# Patient Record
Sex: MALE | Race: WHITE | NOT HISPANIC OR LATINO | Employment: FULL TIME | URBAN - METROPOLITAN AREA
[De-identification: names, ages, dates, MRNs, and addresses within clinical notes are randomized per-mention and may not be internally consistent; named-entity substitution may affect disease eponyms.]

---

## 2023-04-27 PROBLEM — G47.00 INSOMNIA: Status: ACTIVE | Noted: 2023-04-27

## 2023-05-16 ENCOUNTER — TELEPHONE (OUTPATIENT)
Dept: PRIMARY CARE | Facility: CLINIC | Age: 63
End: 2023-05-16
Payer: COMMERCIAL

## 2023-05-16 PROBLEM — L30.9 ECZEMA: Status: ACTIVE | Noted: 2023-05-16

## 2023-05-16 PROBLEM — M19.90 OSTEOARTHRITIS: Status: ACTIVE | Noted: 2023-05-16

## 2023-05-16 PROBLEM — K57.80 PERFORATED DIVERTICULUM: Status: ACTIVE | Noted: 2023-05-16

## 2023-05-16 PROBLEM — M35.3 POLYMYALGIA RHEUMATICA (MULTI): Status: ACTIVE | Noted: 2023-05-16

## 2023-05-16 PROBLEM — R10.9 FLANK PAIN: Status: ACTIVE | Noted: 2023-05-16

## 2023-05-16 PROBLEM — E78.5 HYPERLIPIDEMIA: Status: ACTIVE | Noted: 2023-05-16

## 2023-05-16 PROBLEM — M48.062 LUMBAR STENOSIS WITH NEUROGENIC CLAUDICATION: Status: ACTIVE | Noted: 2023-05-16

## 2023-05-16 PROBLEM — I26.99 PULMONARY EMBOLISM (MULTI): Status: ACTIVE | Noted: 2023-05-16

## 2023-05-16 PROBLEM — M48.00 SPINAL STENOSIS: Status: ACTIVE | Noted: 2023-05-16

## 2023-05-16 PROBLEM — M54.12 CERVICAL RADICULAR PAIN: Status: ACTIVE | Noted: 2023-05-16

## 2023-05-16 PROBLEM — M54.50 LUMBAR PAIN WITH RADIATION DOWN LEFT LEG: Status: ACTIVE | Noted: 2023-05-16

## 2023-05-16 PROBLEM — M72.2 PLANTAR FASCIITIS, LEFT: Status: ACTIVE | Noted: 2023-05-16

## 2023-05-16 PROBLEM — G47.33 OSA (OBSTRUCTIVE SLEEP APNEA): Status: ACTIVE | Noted: 2023-05-16

## 2023-05-16 PROBLEM — E55.9 VITAMIN D DEFICIENCY: Status: ACTIVE | Noted: 2023-05-16

## 2023-05-16 PROBLEM — J34.2 DEVIATED NASAL SEPTUM: Status: ACTIVE | Noted: 2023-05-16

## 2023-05-16 PROBLEM — M77.02 MEDIAL EPICONDYLITIS OF LEFT ELBOW: Status: ACTIVE | Noted: 2023-05-16

## 2023-05-16 PROBLEM — R06.00 DYSPNEA, UNSPECIFIED: Status: ACTIVE | Noted: 2023-05-16

## 2023-05-16 PROBLEM — M95.0 NASAL DEFORMITY, ACQUIRED: Status: ACTIVE | Noted: 2023-05-16

## 2023-05-16 PROBLEM — M54.16 LUMBAR RADICULOPATHY: Status: ACTIVE | Noted: 2023-05-16

## 2023-05-16 PROBLEM — M17.0 PRIMARY OSTEOARTHRITIS OF BOTH KNEES: Status: ACTIVE | Noted: 2023-05-16

## 2023-05-16 PROBLEM — M25.50 ARTHRALGIA: Status: ACTIVE | Noted: 2023-05-16

## 2023-05-16 PROBLEM — K57.32 SIGMOID DIVERTICULITIS: Status: ACTIVE | Noted: 2023-05-16

## 2023-05-16 PROBLEM — M79.605 LUMBAR PAIN WITH RADIATION DOWN LEFT LEG: Status: ACTIVE | Noted: 2023-05-16

## 2023-05-16 PROBLEM — N52.9 INABILITY TO ATTAIN ERECTION: Status: ACTIVE | Noted: 2023-05-16

## 2023-05-16 PROBLEM — R07.9 CHEST PAIN: Status: ACTIVE | Noted: 2023-05-16

## 2023-05-16 PROBLEM — M46.1 SACROILIITIS (CMS-HCC): Status: ACTIVE | Noted: 2023-05-16

## 2023-05-16 PROBLEM — I83.899 VARICOSE VEINS OF LEG WITH SWELLING: Status: ACTIVE | Noted: 2023-05-16

## 2023-05-16 PROBLEM — S99.921A RIGHT FOOT INJURY: Status: ACTIVE | Noted: 2023-05-16

## 2023-05-16 PROBLEM — J34.3 HYPERTROPHY OF BOTH INFERIOR NASAL TURBINATES: Status: ACTIVE | Noted: 2023-05-16

## 2023-05-16 PROBLEM — M47.816 LUMBAR SPONDYLOSIS: Status: ACTIVE | Noted: 2023-05-16

## 2023-05-16 RX ORDER — CALCIUM CARBONATE/VITAMIN D3 600MG-5MCG
1 TABLET ORAL DAILY
COMMUNITY
Start: 2017-06-14

## 2023-05-16 RX ORDER — ASPIRIN 81 MG/1
1 TABLET ORAL DAILY
COMMUNITY

## 2023-05-16 RX ORDER — GABAPENTIN 300 MG/1
CAPSULE ORAL
COMMUNITY
Start: 2023-02-15 | End: 2024-04-01 | Stop reason: SDUPTHER

## 2023-05-16 RX ORDER — ZOLPIDEM TARTRATE 5 MG/1
5 TABLET ORAL NIGHTLY PRN
COMMUNITY
End: 2023-05-17 | Stop reason: SDUPTHER

## 2023-05-16 RX ORDER — CHOLECALCIFEROL (VITAMIN D3) 25 MCG
1 TABLET ORAL DAILY
COMMUNITY
Start: 2017-06-14 | End: 2024-04-01 | Stop reason: SDUPTHER

## 2023-05-16 RX ORDER — METHOCARBAMOL 500 MG/1
TABLET, FILM COATED ORAL
COMMUNITY
Start: 2021-10-22

## 2023-05-16 NOTE — TELEPHONE ENCOUNTER
The patients wife Roseann called and stated that Uzair is having an episode of Diverticulitis and was requesting a rx to be called in.  [facility] [place]

## 2023-05-17 ENCOUNTER — LAB (OUTPATIENT)
Dept: LAB | Facility: LAB | Age: 63
End: 2023-05-17
Payer: COMMERCIAL

## 2023-05-17 ENCOUNTER — OFFICE VISIT (OUTPATIENT)
Dept: PRIMARY CARE | Facility: CLINIC | Age: 63
End: 2023-05-17
Payer: COMMERCIAL

## 2023-05-17 VITALS
WEIGHT: 231.8 LBS | SYSTOLIC BLOOD PRESSURE: 122 MMHG | BODY MASS INDEX: 33.26 KG/M2 | RESPIRATION RATE: 16 BRPM | HEART RATE: 96 BPM | TEMPERATURE: 97.1 F | DIASTOLIC BLOOD PRESSURE: 88 MMHG | OXYGEN SATURATION: 94 %

## 2023-05-17 DIAGNOSIS — K57.92 DIVERTICULITIS: Primary | ICD-10-CM

## 2023-05-17 DIAGNOSIS — F51.01 PRIMARY INSOMNIA: ICD-10-CM

## 2023-05-17 DIAGNOSIS — M35.3 POLYMYALGIA RHEUMATICA (MULTI): ICD-10-CM

## 2023-05-17 DIAGNOSIS — K57.92 DIVERTICULITIS: ICD-10-CM

## 2023-05-17 DIAGNOSIS — K57.32 SIGMOID DIVERTICULITIS: Primary | ICD-10-CM

## 2023-05-17 DIAGNOSIS — K57.32 SIGMOID DIVERTICULITIS: ICD-10-CM

## 2023-05-17 LAB
ANION GAP IN SER/PLAS: 14 MMOL/L (ref 10–20)
BASOPHILS (10*3/UL) IN BLOOD BY AUTOMATED COUNT: 0.03 X10E9/L (ref 0–0.1)
BASOPHILS/100 LEUKOCYTES IN BLOOD BY AUTOMATED COUNT: 0.3 % (ref 0–2)
CALCIUM (MG/DL) IN SER/PLAS: 9.1 MG/DL (ref 8.6–10.3)
CARBON DIOXIDE, TOTAL (MMOL/L) IN SER/PLAS: 27 MMOL/L (ref 21–32)
CHLORIDE (MMOL/L) IN SER/PLAS: 99 MMOL/L (ref 98–107)
CREATININE (MG/DL) IN SER/PLAS: 1.16 MG/DL (ref 0.5–1.3)
EOSINOPHILS (10*3/UL) IN BLOOD BY AUTOMATED COUNT: 0.14 X10E9/L (ref 0–0.7)
EOSINOPHILS/100 LEUKOCYTES IN BLOOD BY AUTOMATED COUNT: 1.3 % (ref 0–6)
ERYTHROCYTE DISTRIBUTION WIDTH (RATIO) BY AUTOMATED COUNT: 12.5 % (ref 11.5–14.5)
ERYTHROCYTE MEAN CORPUSCULAR HEMOGLOBIN CONCENTRATION (G/DL) BY AUTOMATED: 32.6 G/DL (ref 32–36)
ERYTHROCYTE MEAN CORPUSCULAR VOLUME (FL) BY AUTOMATED COUNT: 90 FL (ref 80–100)
ERYTHROCYTES (10*6/UL) IN BLOOD BY AUTOMATED COUNT: 5.05 X10E12/L (ref 4.5–5.9)
GFR MALE: 72 ML/MIN/1.73M2
GLUCOSE (MG/DL) IN SER/PLAS: 95 MG/DL (ref 74–99)
HEMATOCRIT (%) IN BLOOD BY AUTOMATED COUNT: 45.7 % (ref 41–52)
HEMOGLOBIN (G/DL) IN BLOOD: 14.9 G/DL (ref 13.5–17.5)
IMMATURE GRANULOCYTES/100 LEUKOCYTES IN BLOOD BY AUTOMATED COUNT: 0.3 % (ref 0–0.9)
LEUKOCYTES (10*3/UL) IN BLOOD BY AUTOMATED COUNT: 11 X10E9/L (ref 4.4–11.3)
LYMPHOCYTES (10*3/UL) IN BLOOD BY AUTOMATED COUNT: 1.55 X10E9/L (ref 1.2–4.8)
LYMPHOCYTES/100 LEUKOCYTES IN BLOOD BY AUTOMATED COUNT: 14.1 % (ref 13–44)
MONOCYTES (10*3/UL) IN BLOOD BY AUTOMATED COUNT: 0.83 X10E9/L (ref 0.1–1)
MONOCYTES/100 LEUKOCYTES IN BLOOD BY AUTOMATED COUNT: 7.6 % (ref 2–10)
NEUTROPHILS (10*3/UL) IN BLOOD BY AUTOMATED COUNT: 8.38 X10E9/L (ref 1.2–7.7)
NEUTROPHILS/100 LEUKOCYTES IN BLOOD BY AUTOMATED COUNT: 76.4 % (ref 40–80)
NRBC (PER 100 WBCS) BY AUTOMATED COUNT: 0 /100 WBC (ref 0–0)
PLATELETS (10*3/UL) IN BLOOD AUTOMATED COUNT: 230 X10E9/L (ref 150–450)
POTASSIUM (MMOL/L) IN SER/PLAS: 4.2 MMOL/L (ref 3.5–5.3)
SODIUM (MMOL/L) IN SER/PLAS: 136 MMOL/L (ref 136–145)
UREA NITROGEN (MG/DL) IN SER/PLAS: 15 MG/DL (ref 6–23)

## 2023-05-17 PROCEDURE — 36415 COLL VENOUS BLD VENIPUNCTURE: CPT

## 2023-05-17 PROCEDURE — 80048 BASIC METABOLIC PNL TOTAL CA: CPT

## 2023-05-17 PROCEDURE — 99213 OFFICE O/P EST LOW 20 MIN: CPT | Performed by: INTERNAL MEDICINE

## 2023-05-17 PROCEDURE — 1036F TOBACCO NON-USER: CPT | Performed by: INTERNAL MEDICINE

## 2023-05-17 PROCEDURE — 85025 COMPLETE CBC W/AUTO DIFF WBC: CPT

## 2023-05-17 RX ORDER — ZOLPIDEM TARTRATE 5 MG/1
5 TABLET ORAL NIGHTLY PRN
Qty: 90 TABLET | Refills: 0 | Status: SHIPPED | OUTPATIENT
Start: 2023-05-17 | End: 2023-08-23 | Stop reason: SDUPTHER

## 2023-05-17 RX ORDER — METRONIDAZOLE 500 MG/1
500 TABLET ORAL 3 TIMES DAILY
Qty: 30 TABLET | Refills: 0 | Status: SHIPPED | OUTPATIENT
Start: 2023-05-17 | End: 2023-05-27

## 2023-05-17 RX ORDER — AMOXICILLIN AND CLAVULANATE POTASSIUM 875; 125 MG/1; MG/1
875 TABLET, FILM COATED ORAL 2 TIMES DAILY
Qty: 20 TABLET | Refills: 0 | Status: SHIPPED | OUTPATIENT
Start: 2023-05-17 | End: 2023-05-27

## 2023-05-17 RX ORDER — OXYCODONE AND ACETAMINOPHEN 5; 325 MG/1; MG/1
1 TABLET ORAL 3 TIMES DAILY PRN
COMMUNITY
Start: 2022-08-01 | End: 2024-04-01 | Stop reason: SDUPTHER

## 2023-05-17 RX ORDER — PREDNISONE 1 MG/1
2 TABLET ORAL
COMMUNITY
Start: 2017-10-26 | End: 2023-08-23 | Stop reason: SDUPTHER

## 2023-05-17 NOTE — PROGRESS NOTES
The patient is here today for possible diverticulitis, the  onset of the symptoms was x 3 days ago.Subjective   Patient ID: Anton Whitt is a 62 y.o. male who presents for Diverticulitis.  Was in [place] last week  Now with left side pain  Not eaten any solids since Sunda  Only water and popsicles  Some  nausea when stands up  Passing gas  Watery BM yesterday  Tactile fever  Not improving    Had this in 8/2019 and a small perf in sigmoid colon then            Review of Systems   All other systems reviewed and are negative.      Objective   Physical Exam  Cardiovascular:      Rate and Rhythm: Normal rate and regular rhythm.   Pulmonary:      Effort: Pulmonary effort is normal.      Breath sounds: Normal breath sounds.   Abdominal:      General: Abdomen is flat.      Palpations: Abdomen is soft.      Tenderness: There is abdominal tenderness. There is no guarding or rebound.      Comments: LLQ tenderness to palp   Neurological:      Mental Status: He is alert.         Assessment/Plan   Problem List Items Addressed This Visit       Insomnia    Relevant Medications    zolpidem (Ambien) 5 mg tablet    Polymyalgia rheumatica (CMS/HCC)     Other Visit Diagnoses       Diverticulitis    -  Primary    CT abd/pelvis today- he will need to go to Er if any perforation  Start Augmentin and Flagyl  If worsens to ER    Relevant Orders    CT abdomen pelvis w IV contrast    CBC and Auto Differential         Addendum - Radiology called - acute diverticulitis  with perf  Spoke with pt  Will go to the ER  Spoke with Dr Sarmiento - will consult CR surgeon

## 2023-05-19 ENCOUNTER — TELEPHONE (OUTPATIENT)
Dept: PRIMARY CARE | Facility: CLINIC | Age: 63
End: 2023-05-19
Payer: COMMERCIAL

## 2023-05-19 NOTE — TELEPHONE ENCOUNTER
Spoke with pt  Getting ready to be discharged from [facility]  Feeling better  Headed to [place] 5/29   Will see him before

## 2023-05-22 PROBLEM — M54.6 PAIN IN THORACIC SPINE: Status: ACTIVE | Noted: 2022-08-01

## 2023-05-22 PROBLEM — K57.92 DIVERTICULITIS OF INTESTINE: Status: ACTIVE | Noted: 2023-05-22

## 2023-05-22 PROBLEM — R10.9 ABDOMINAL PAIN: Status: ACTIVE | Noted: 2023-05-22

## 2023-05-22 PROBLEM — Z86.711 HISTORY OF PULMONARY EMBOLISM: Status: ACTIVE | Noted: 2023-05-22

## 2023-05-22 PROBLEM — Z86.718 HISTORY OF DEEP VEIN THROMBOSIS: Status: ACTIVE | Noted: 2023-05-22

## 2023-05-22 PROBLEM — Z86.718 HISTORY OF THROMBOEMBOLISM OF VEIN: Status: ACTIVE | Noted: 2022-08-01

## 2023-05-22 PROBLEM — I82.409 DVT (DEEP VENOUS THROMBOSIS) (MULTI): Status: ACTIVE | Noted: 2023-05-22

## 2023-05-22 PROBLEM — I45.10 INCOMPLETE RIGHT BUNDLE BRANCH BLOCK (RBBB): Status: ACTIVE | Noted: 2023-05-22

## 2023-05-23 ENCOUNTER — PATIENT OUTREACH (OUTPATIENT)
Dept: CARE COORDINATION | Facility: CLINIC | Age: 63
End: 2023-05-23
Payer: COMMERCIAL

## 2023-05-23 RX ORDER — AMOXICILLIN AND CLAVULANATE POTASSIUM 875; 125 MG/1; MG/1
875 TABLET, FILM COATED ORAL 2 TIMES DAILY
Qty: 26 TABLET | Refills: 0 | COMMUNITY
Start: 2023-05-19 | End: 2023-06-01

## 2023-05-23 NOTE — PROGRESS NOTES
Discharge Facility: [facility]  Discharge Diagnosis: perforated diverticulum  Admission Date: 5/17/23  Discharge Date: 5/19/23    PCP Appointment Date: 5/26/23  Specialist Appointment Date: 6/8/23 Dr Vaca  Hospital Encounter and Summary: Linked but more information noted in old allscripts    Left VM x2 to outreach for hospital follow up, no return call at this time. PCP spoke with patient per chart note on 5/19. Will follow up after PCP appt to check in at that time. On 14 day course augmentin, patient to hold prednisone until this is completed. To follow full liquid diet for 2 weeks then low fiber diet.

## 2023-05-26 ENCOUNTER — OFFICE VISIT (OUTPATIENT)
Dept: PRIMARY CARE | Facility: CLINIC | Age: 63
End: 2023-05-26
Payer: COMMERCIAL

## 2023-05-26 VITALS
SYSTOLIC BLOOD PRESSURE: 116 MMHG | TEMPERATURE: 98.1 F | HEART RATE: 77 BPM | DIASTOLIC BLOOD PRESSURE: 75 MMHG | WEIGHT: 226.8 LBS | OXYGEN SATURATION: 100 % | BODY MASS INDEX: 32.54 KG/M2 | RESPIRATION RATE: 16 BRPM

## 2023-05-26 DIAGNOSIS — K57.32 SIGMOID DIVERTICULITIS: ICD-10-CM

## 2023-05-26 DIAGNOSIS — G47.33 OSA (OBSTRUCTIVE SLEEP APNEA): Primary | ICD-10-CM

## 2023-05-26 DIAGNOSIS — M35.3 POLYMYALGIA RHEUMATICA (MULTI): ICD-10-CM

## 2023-05-26 PROCEDURE — 99214 OFFICE O/P EST MOD 30 MIN: CPT | Performed by: INTERNAL MEDICINE

## 2023-05-26 PROCEDURE — 1036F TOBACCO NON-USER: CPT | Performed by: INTERNAL MEDICINE

## 2023-05-26 ASSESSMENT — ENCOUNTER SYMPTOMS
OCCASIONAL FEELINGS OF UNSTEADINESS: 0
ABDOMINAL DISTENTION: 0
ABDOMINAL PAIN: 0
DIARRHEA: 0
DEPRESSION: 0
COUGH: 0
CONSTIPATION: 0
LOSS OF SENSATION IN FEET: 0
ACTIVITY CHANGE: 0
APPETITE CHANGE: 0
SHORTNESS OF BREATH: 0
PALPITATIONS: 0

## 2023-05-26 ASSESSMENT — PATIENT HEALTH QUESTIONNAIRE - PHQ9
SUM OF ALL RESPONSES TO PHQ9 QUESTIONS 1 AND 2: 0
1. LITTLE INTEREST OR PLEASURE IN DOING THINGS: NOT AT ALL
2. FEELING DOWN, DEPRESSED OR HOPELESS: NOT AT ALL

## 2023-05-26 NOTE — PROGRESS NOTES
Subjective   Patient ID: Anton Whitt is a 63 y.o. male who presents for Hospital Follow-up.  63 yp male here for hospital follow up -  Was admitted to [facility] with perforated sigmoid diverticulitis - on liquids for 2 weeks then a low fiber diet  Abd pain has resolved          Review of Systems   Constitutional:  Negative for activity change and appetite change.   Respiratory:  Negative for cough and shortness of breath.    Cardiovascular:  Negative for chest pain, palpitations and leg swelling.   Gastrointestinal:  Negative for abdominal distention, abdominal pain, constipation and diarrhea.       Objective   Physical Exam  Abdominal:      General: Abdomen is flat. Bowel sounds are normal. There is no distension.      Palpations: Abdomen is soft.      Tenderness: There is abdominal tenderness in the right lower quadrant.         Assessment/Plan   Problem List Items Addressed This Visit       DONAVAN (obstructive sleep apnea) - Primary    Current Assessment & Plan     He says this is a mistake - never had a sleep study - we will order a sleep study to evaluate         Polymyalgia rheumatica (CMS/HCC)    Current Assessment & Plan     Restart prednisone once surgery says okay         Sigmoid diverticulitis    Current Assessment & Plan     Adv diet per surgery  Colonoscopy ordered

## 2023-06-09 ENCOUNTER — PATIENT OUTREACH (OUTPATIENT)
Dept: CARE COORDINATION | Facility: CLINIC | Age: 63
End: 2023-06-09
Payer: COMMERCIAL

## 2023-06-09 NOTE — PROGRESS NOTES
Unable to reach patient for call back after patient's follow up appointment with PCP.   JACKIEM with call back number for patient to call if needed   If no voicemail available call attempts x 2 were made to contact the patient to assist with any questions or concerns patient may have.

## 2023-06-19 DIAGNOSIS — G47.33 OSA (OBSTRUCTIVE SLEEP APNEA): ICD-10-CM

## 2023-06-20 ENCOUNTER — TELEPHONE (OUTPATIENT)
Dept: PRIMARY CARE | Facility: CLINIC | Age: 63
End: 2023-06-20
Payer: COMMERCIAL

## 2023-07-17 DIAGNOSIS — G47.00 INSOMNIA, UNSPECIFIED TYPE: ICD-10-CM

## 2023-07-17 RX ORDER — TRAZODONE HYDROCHLORIDE 50 MG/1
TABLET ORAL
Qty: 180 TABLET | Refills: 0 | Status: SHIPPED | OUTPATIENT
Start: 2023-07-17 | End: 2023-10-05

## 2023-08-03 ENCOUNTER — PATIENT OUTREACH (OUTPATIENT)
Dept: PRIMARY CARE | Facility: CLINIC | Age: 63
End: 2023-08-03
Payer: COMMERCIAL

## 2023-08-23 ENCOUNTER — OFFICE VISIT (OUTPATIENT)
Dept: PRIMARY CARE | Facility: CLINIC | Age: 63
End: 2023-08-23
Payer: COMMERCIAL

## 2023-08-23 VITALS
RESPIRATION RATE: 16 BRPM | SYSTOLIC BLOOD PRESSURE: 112 MMHG | DIASTOLIC BLOOD PRESSURE: 74 MMHG | BODY MASS INDEX: 32.47 KG/M2 | WEIGHT: 226.8 LBS | HEART RATE: 82 BPM | TEMPERATURE: 97.3 F | OXYGEN SATURATION: 95 % | HEIGHT: 70 IN

## 2023-08-23 DIAGNOSIS — G47.33 OSA (OBSTRUCTIVE SLEEP APNEA): ICD-10-CM

## 2023-08-23 DIAGNOSIS — M35.3 POLYMYALGIA RHEUMATICA (MULTI): Primary | ICD-10-CM

## 2023-08-23 DIAGNOSIS — F51.01 PRIMARY INSOMNIA: ICD-10-CM

## 2023-08-23 DIAGNOSIS — Z00.00 ANNUAL PHYSICAL EXAM: ICD-10-CM

## 2023-08-23 DIAGNOSIS — Z86.711 HISTORY OF PULMONARY EMBOLISM: ICD-10-CM

## 2023-08-23 PROBLEM — I82.409 DVT (DEEP VENOUS THROMBOSIS) (MULTI): Status: RESOLVED | Noted: 2023-05-22 | Resolved: 2023-08-23

## 2023-08-23 PROBLEM — R07.9 CHEST PAIN: Status: RESOLVED | Noted: 2023-05-16 | Resolved: 2023-08-23

## 2023-08-23 PROCEDURE — 90471 IMMUNIZATION ADMIN: CPT | Performed by: INTERNAL MEDICINE

## 2023-08-23 PROCEDURE — 99396 PREV VISIT EST AGE 40-64: CPT | Performed by: INTERNAL MEDICINE

## 2023-08-23 PROCEDURE — 1036F TOBACCO NON-USER: CPT | Performed by: INTERNAL MEDICINE

## 2023-08-23 PROCEDURE — 90715 TDAP VACCINE 7 YRS/> IM: CPT | Performed by: INTERNAL MEDICINE

## 2023-08-23 RX ORDER — ZOLPIDEM TARTRATE 5 MG/1
5 TABLET ORAL NIGHTLY PRN
Qty: 90 TABLET | Refills: 0 | Status: SHIPPED | OUTPATIENT
Start: 2023-08-23 | End: 2023-09-21 | Stop reason: SDUPTHER

## 2023-08-23 RX ORDER — PREDNISONE 1 MG/1
2 TABLET ORAL 2 TIMES DAILY
Qty: 360 TABLET | Refills: 3 | Status: SHIPPED | OUTPATIENT
Start: 2023-08-23 | End: 2024-04-01 | Stop reason: SDUPTHER

## 2023-08-23 ASSESSMENT — ENCOUNTER SYMPTOMS
BLOOD IN STOOL: 0
HEADACHES: 0
TROUBLE SWALLOWING: 0
VOICE CHANGE: 0
COUGH: 0
CHEST TIGHTNESS: 0
PALPITATIONS: 0
ACTIVITY CHANGE: 0
ARTHRALGIAS: 0
TREMORS: 0
SHORTNESS OF BREATH: 0
CONSTIPATION: 0
DIZZINESS: 0
RHINORRHEA: 0
DYSURIA: 0
ABDOMINAL PAIN: 0
UNEXPECTED WEIGHT CHANGE: 0
APPETITE CHANGE: 0
DIARRHEA: 0
FATIGUE: 0
SORE THROAT: 0

## 2023-08-23 ASSESSMENT — PATIENT HEALTH QUESTIONNAIRE - PHQ9
1. LITTLE INTEREST OR PLEASURE IN DOING THINGS: NOT AT ALL
SUM OF ALL RESPONSES TO PHQ9 QUESTIONS 1 AND 2: 0
2. FEELING DOWN, DEPRESSED OR HOPELESS: NOT AT ALL

## 2023-08-23 NOTE — ASSESSMENT & PLAN NOTE
Would like to restart Prednisone   Needs to check with surgeon on when he can restart  If surgery okay with it, start Prednisone 10mg bid for 1 week then 5mg bid for 1 week then 2mg bid

## 2023-08-23 NOTE — PROGRESS NOTES
The patient is here today for a physical exam.  The patient was admitted again for a perforated colon since his last appointment.Subjective   Patient ID: Anton Whitt is a 63 y.o. male who presents for Annual Exam.  62 yo male here for a physical  Was admitted 7/31 for a diverticular perforation - conservative treatment  Discharged with 2 weeks of liquid diet and antibiotics  No bowel complaints today  Has lost 11lbs through this  Seeing Gen Surg today     - 3 kids - 37 -36-31 (one son - 2 daughters) one lives in [place] - other 2 local - daughter getting  this year  [employer] owner  Exercise - none right now  Non smoker  Alcohol - 10-12 per week  Marijuana - denies          Review of Systems   Constitutional:  Negative for activity change, appetite change, fatigue and unexpected weight change.   HENT:  Negative for ear pain, hearing loss, rhinorrhea, sore throat, trouble swallowing and voice change.    Eyes:  Negative for visual disturbance.   Respiratory:  Negative for cough, chest tightness and shortness of breath.    Cardiovascular:  Negative for chest pain, palpitations and leg swelling.   Gastrointestinal:  Negative for abdominal pain, blood in stool, constipation and diarrhea.   Genitourinary:  Negative for dysuria, scrotal swelling and testicular pain.   Musculoskeletal:  Negative for arthralgias.   Skin:  Negative for rash.   Neurological:  Negative for dizziness, tremors, syncope and headaches.       Objective   Physical Exam  Constitutional:       General: He is not in acute distress.     Appearance: He is well-developed. He is not diaphoretic.   HENT:      Head: Normocephalic.      Right Ear: Tympanic membrane normal. There is no impacted cerumen.      Left Ear: Tympanic membrane normal. There is no impacted cerumen.      Nose: Nose normal.      Mouth/Throat:      Mouth: Mucous membranes are moist.      Pharynx: Oropharynx is clear. No oropharyngeal exudate or  posterior oropharyngeal erythema.   Eyes:      General: No scleral icterus.     Extraocular Movements: Extraocular movements intact.      Conjunctiva/sclera: Conjunctivae normal.      Pupils: Pupils are equal, round, and reactive to light.   Neck:      Thyroid: No thyromegaly.      Vascular: No JVD.   Cardiovascular:      Rate and Rhythm: Normal rate and regular rhythm.      Pulses: Normal pulses.      Heart sounds: Normal heart sounds. No murmur heard.     No friction rub. No gallop.   Pulmonary:      Effort: Pulmonary effort is normal. No respiratory distress.      Breath sounds: Normal breath sounds. No wheezing or rales.   Chest:      Chest wall: No tenderness.   Abdominal:      General: Bowel sounds are normal. There is no distension.      Palpations: Abdomen is soft. There is no mass.      Tenderness: There is no abdominal tenderness. There is no rebound.   Musculoskeletal:         General: Normal range of motion.      Cervical back: Normal range of motion and neck supple.   Lymphadenopathy:      Cervical: No cervical adenopathy.   Skin:     General: Skin is warm and dry.   Neurological:      General: No focal deficit present.      Mental Status: He is alert and oriented to person, place, and time.      Deep Tendon Reflexes: Reflexes normal.   Psychiatric:         Mood and Affect: Mood normal.         Thought Content: Thought content normal.         Assessment/Plan   Problem List Items Addressed This Visit       Insomnia    Relevant Medications    zolpidem (Ambien) 5 mg tablet    DONAVAN (obstructive sleep apnea)    Current Assessment & Plan     He would like to see a dentist for a mouth guard - does not want to use CPAP         Polymyalgia rheumatica (CMS/HCC) - Primary    Current Assessment & Plan     Would like to restart Prednisone   Needs to check with surgeon on when he can restart  If surgery okay with it, start Prednisone 10mg bid for 1 week then 5mg bid for 1 week then 2mg bid         Relevant Medications     predniSONE (Deltasone) 1 mg tablet    Other Relevant Orders    Sedimentation Rate    C-reactive protein    History of pulmonary embolism    Current Assessment & Plan     On Xarelto          Other Visit Diagnoses       Annual physical exam        Relevant Orders    Lipid Panel    TSH with reflex to Free T4 if abnormal

## 2023-08-24 ENCOUNTER — LAB (OUTPATIENT)
Dept: LAB | Facility: LAB | Age: 63
End: 2023-08-24
Payer: COMMERCIAL

## 2023-08-24 DIAGNOSIS — M35.3 POLYMYALGIA RHEUMATICA (MULTI): ICD-10-CM

## 2023-08-24 DIAGNOSIS — Z00.00 ANNUAL PHYSICAL EXAM: ICD-10-CM

## 2023-08-24 LAB
C REACTIVE PROTEIN (MG/L) IN SER/PLAS: 0.21 MG/DL
CHOLESTEROL (MG/DL) IN SER/PLAS: 204 MG/DL (ref 0–199)
CHOLESTEROL IN HDL (MG/DL) IN SER/PLAS: 55.4 MG/DL
CHOLESTEROL/HDL RATIO: 3.7
LDL: 132 MG/DL (ref 0–99)
SEDIMENTATION RATE, ERYTHROCYTE: 5 MM/H (ref 0–20)
THYROTROPIN (MIU/L) IN SER/PLAS BY DETECTION LIMIT <= 0.05 MIU/L: 1.16 MIU/L (ref 0.44–3.98)
TRIGLYCERIDE (MG/DL) IN SER/PLAS: 85 MG/DL (ref 0–149)
VLDL: 17 MG/DL (ref 0–40)

## 2023-08-24 PROCEDURE — 86140 C-REACTIVE PROTEIN: CPT

## 2023-08-24 PROCEDURE — 36415 COLL VENOUS BLD VENIPUNCTURE: CPT

## 2023-08-24 PROCEDURE — 80061 LIPID PANEL: CPT

## 2023-08-24 PROCEDURE — 84443 ASSAY THYROID STIM HORMONE: CPT

## 2023-08-24 PROCEDURE — 85652 RBC SED RATE AUTOMATED: CPT

## 2023-09-07 ENCOUNTER — PATIENT OUTREACH (OUTPATIENT)
Dept: PRIMARY CARE | Facility: CLINIC | Age: 63
End: 2023-09-07
Payer: COMMERCIAL

## 2023-09-21 DIAGNOSIS — F51.01 PRIMARY INSOMNIA: ICD-10-CM

## 2023-09-21 RX ORDER — ZOLPIDEM TARTRATE 5 MG/1
5 TABLET ORAL NIGHTLY PRN
Qty: 30 TABLET | Refills: 0 | Status: SHIPPED | OUTPATIENT
Start: 2023-09-21 | End: 2023-10-30 | Stop reason: SDUPTHER

## 2023-10-05 DIAGNOSIS — G47.00 INSOMNIA, UNSPECIFIED TYPE: ICD-10-CM

## 2023-10-05 RX ORDER — TRAZODONE HYDROCHLORIDE 50 MG/1
50 TABLET ORAL NIGHTLY
Qty: 180 TABLET | Refills: 0 | Status: SHIPPED | OUTPATIENT
Start: 2023-10-05 | End: 2024-02-26 | Stop reason: SDUPTHER

## 2023-10-30 DIAGNOSIS — F51.01 PRIMARY INSOMNIA: ICD-10-CM

## 2023-10-30 RX ORDER — ZOLPIDEM TARTRATE 5 MG/1
5 TABLET ORAL NIGHTLY PRN
Qty: 30 TABLET | Refills: 0 | Status: SHIPPED | OUTPATIENT
Start: 2023-10-30 | End: 2023-11-27 | Stop reason: SDUPTHER

## 2023-11-02 ENCOUNTER — PATIENT OUTREACH (OUTPATIENT)
Dept: PRIMARY CARE | Facility: CLINIC | Age: 63
End: 2023-11-02
Payer: COMMERCIAL

## 2023-11-02 NOTE — PROGRESS NOTES
Patient has met target of no readmission for (90) days post hospital discharge and is graduated from Transitional Care Management program at this time.  LVM with contact info if any needs.

## 2023-11-07 ENCOUNTER — OFFICE VISIT (OUTPATIENT)
Dept: HEMATOLOGY/ONCOLOGY | Facility: CLINIC | Age: 63
End: 2023-11-07
Payer: COMMERCIAL

## 2023-11-07 ENCOUNTER — LAB (OUTPATIENT)
Dept: LAB | Facility: CLINIC | Age: 63
End: 2023-11-07
Payer: COMMERCIAL

## 2023-11-07 VITALS
HEART RATE: 74 BPM | WEIGHT: 225.75 LBS | SYSTOLIC BLOOD PRESSURE: 139 MMHG | TEMPERATURE: 97 F | OXYGEN SATURATION: 97 % | DIASTOLIC BLOOD PRESSURE: 76 MMHG | BODY MASS INDEX: 32.39 KG/M2 | RESPIRATION RATE: 16 BRPM

## 2023-11-07 DIAGNOSIS — I26.99 RECURRENT PULMONARY EMBOLISM (MULTI): ICD-10-CM

## 2023-11-07 DIAGNOSIS — E55.9 VITAMIN D DEFICIENCY: ICD-10-CM

## 2023-11-07 DIAGNOSIS — F51.01 PRIMARY INSOMNIA: Primary | ICD-10-CM

## 2023-11-07 DIAGNOSIS — M35.3 POLYMYALGIA RHEUMATICA (MULTI): ICD-10-CM

## 2023-11-07 DIAGNOSIS — I27.82 CHRONIC PULMONARY EMBOLISM, UNSPECIFIED PULMONARY EMBOLISM TYPE, UNSPECIFIED WHETHER ACUTE COR PULMONALE PRESENT (MULTI): Primary | ICD-10-CM

## 2023-11-07 LAB
BASOPHILS # BLD AUTO: 0.04 X10*3/UL (ref 0–0.1)
BASOPHILS NFR BLD AUTO: 0.6 %
EOSINOPHIL # BLD AUTO: 0.17 X10*3/UL (ref 0–0.7)
EOSINOPHIL NFR BLD AUTO: 2.4 %
ERYTHROCYTE [DISTWIDTH] IN BLOOD BY AUTOMATED COUNT: 13.5 % (ref 11.5–14.5)
HCT VFR BLD AUTO: 41.2 % (ref 41–52)
HGB BLD-MCNC: 14 G/DL (ref 13.5–17.5)
HOLD SPECIMEN: NORMAL
IMM GRANULOCYTES # BLD AUTO: 0 X10*3/UL (ref 0–0.7)
IMM GRANULOCYTES NFR BLD AUTO: 0 % (ref 0–0.9)
LYMPHOCYTES # BLD AUTO: 1.61 X10*3/UL (ref 1.2–4.8)
LYMPHOCYTES NFR BLD AUTO: 22.5 %
MCH RBC QN AUTO: 30.8 PG (ref 26–34)
MCHC RBC AUTO-ENTMCNC: 34 G/DL (ref 32–36)
MCV RBC AUTO: 91 FL (ref 80–100)
MONOCYTES # BLD AUTO: 0.52 X10*3/UL (ref 0.1–1)
MONOCYTES NFR BLD AUTO: 7.3 %
NEUTROPHILS # BLD AUTO: 4.82 X10*3/UL (ref 1.2–7.7)
NEUTROPHILS NFR BLD AUTO: 67.2 %
PLATELET # BLD AUTO: 217 X10*3/UL (ref 150–450)
RBC # BLD AUTO: 4.55 X10*6/UL (ref 4.5–5.9)
WBC # BLD AUTO: 7.2 X10*3/UL (ref 4.4–11.3)

## 2023-11-07 PROCEDURE — 36415 COLL VENOUS BLD VENIPUNCTURE: CPT

## 2023-11-07 PROCEDURE — 99214 OFFICE O/P EST MOD 30 MIN: CPT | Performed by: INTERNAL MEDICINE

## 2023-11-07 PROCEDURE — 1036F TOBACCO NON-USER: CPT | Performed by: INTERNAL MEDICINE

## 2023-11-07 PROCEDURE — 85025 COMPLETE CBC W/AUTO DIFF WBC: CPT

## 2023-11-07 ASSESSMENT — PAIN SCALES - GENERAL: PAINLEVEL: 6

## 2023-11-07 NOTE — PROGRESS NOTES
Patient ID: Anton Whitt is a 63 y.o. male.  Referring Physician: No referring provider defined for this encounter.  Primary Care Provider: Adry Presley MD  Visit Type: Follow Up      Subjective    HPI  I am doing okay, I need a refill of xarelto    Review of Systems   Constitutional: Negative.    HENT:  Negative.     Eyes: Negative.    Respiratory: Negative.     Cardiovascular: Negative.    Gastrointestinal: Negative.    Endocrine: Negative.    Genitourinary: Negative.     Musculoskeletal: Negative.    Skin: Negative.    Neurological: Negative.    Hematological: Negative.    Psychiatric/Behavioral: Negative.          Objective   BSA: 2.24 meters squared  /76 (BP Location: Right arm)   Pulse 74   Temp 36.1 °C (97 °F)   Resp 16   Wt 102 kg (225 lb 12 oz)   SpO2 97%   BMI 32.39 kg/m²      has a past medical history of Acute embolism and thrombosis of unspecified deep veins of unspecified lower extremity (CMS/HCC) (11/11/2013), Acute upper respiratory infection, unspecified (04/25/2018), Body mass index (BMI) 35.0-35.9, adult (09/17/2018), Encounter for other preprocedural examination (06/25/2015), Encounter for other preprocedural examination (06/25/2015), Encounter for screening for cardiovascular disorders, Insect bite (nonvenomous) of lower back and pelvis, initial encounter (08/05/2014), Ocular pain, left eye (09/07/2018), Other conditions influencing health status (03/21/2017), Other polyuria (05/29/2013), Other shoulder lesions, left shoulder (04/11/2016), Other shoulder lesions, unspecified shoulder (03/13/2013), Other specified soft tissue disorders (03/21/2017), Pain in left knee (08/23/2019), Pain in unspecified knee (12/09/2014), Personal history of other diseases of the circulatory system (01/20/2020), Personal history of other diseases of the circulatory system, Personal history of other diseases of the nervous system and sense organs (05/06/2015), Personal history of other diseases of  the nervous system and sense organs, Personal history of other endocrine, nutritional and metabolic disease (04/14/2015), Personal history of other specified conditions (02/19/2020), Personal history of other specified conditions (06/26/2013), Personal history of other specified conditions, Phlebitis and thrombophlebitis of superficial vessels of left lower extremity (03/21/2017), Phlebitis and thrombophlebitis of superficial vessels of right lower extremity (03/21/2017), and Phlebitis and thrombophlebitis of unspecified site.   has a past surgical history that includes Other surgical history (04/29/2013); Knee arthroscopy w/ debridement (04/29/2013); Back surgery (10/26/2015); Other surgical history (10/26/2015); and Other surgical history (03/09/2022).  Family History   Problem Relation Name Age of Onset    Heart attack Mother      Cancer Father       Oncology History    No history exists.       Anton Whitt  reports that he has never smoked. He has never used smokeless tobacco.  He  reports current alcohol use.  He  reports no history of drug use.    Physical Exam  Vitals reviewed.   HENT:      Head: Normocephalic.      Mouth/Throat:      Mouth: Mucous membranes are moist.   Eyes:      Extraocular Movements: Extraocular movements intact.      Pupils: Pupils are equal, round, and reactive to light.   Cardiovascular:      Rate and Rhythm: Normal rate and regular rhythm.      Heart sounds: Normal heart sounds.   Pulmonary:      Breath sounds: Normal breath sounds.   Abdominal:      General: Bowel sounds are normal.      Palpations: Abdomen is soft.   Musculoskeletal:      Cervical back: Normal range of motion and neck supple.   Neurological:      Mental Status: He is alert.         WBC   Date/Time Value Ref Range Status   08/01/2023 08:10 AM 7.0 4.4 - 11.3 x10E9/L Final   07/31/2023 01:19 PM 10.2 4.4 - 11.3 x10E9/L Final   05/19/2023 06:24 AM 6.7 4.4 - 11.3 x10E9/L Final     nRBC   Date Value Ref Range Status  "  08/01/2023 0.0 0.0 - 0.0 /100 WBC Final   07/31/2023 0.0 0.0 - 0.0 /100 WBC Final   05/19/2023 0.0 0.0 - 0.0 /100 WBC Final     RBC   Date Value Ref Range Status   08/01/2023 4.49 (L) 4.50 - 5.90 x10E12/L Final   07/31/2023 4.96 4.50 - 5.90 x10E12/L Final   05/19/2023 4.45 (L) 4.50 - 5.90 x10E12/L Final     Hemoglobin   Date Value Ref Range Status   08/01/2023 13.2 (L) 13.5 - 17.5 g/dL Final   07/31/2023 14.6 13.5 - 17.5 g/dL Final   05/19/2023 13.2 (L) 13.5 - 17.5 g/dL Final     Hematocrit   Date Value Ref Range Status   08/01/2023 40.0 (L) 41.0 - 52.0 % Final   07/31/2023 43.8 41.0 - 52.0 % Final   05/19/2023 40.5 (L) 41.0 - 52.0 % Final     MCV   Date/Time Value Ref Range Status   08/01/2023 08:10 AM 89 80 - 100 fL Final   07/31/2023 01:19 PM 88 80 - 100 fL Final   05/19/2023 06:24 AM 91 80 - 100 fL Final     No results found for: \"MCH\"  MCHC   Date/Time Value Ref Range Status   08/01/2023 08:10 AM 33.0 32.0 - 36.0 g/dL Final   07/31/2023 01:19 PM 33.3 32.0 - 36.0 g/dL Final   05/19/2023 06:24 AM 32.6 32.0 - 36.0 g/dL Final     RDW   Date/Time Value Ref Range Status   08/01/2023 08:10 AM 12.6 11.5 - 14.5 % Final   07/31/2023 01:19 PM 12.4 11.5 - 14.5 % Final   05/19/2023 06:24 AM 12.4 11.5 - 14.5 % Final     Platelets   Date/Time Value Ref Range Status   08/01/2023 08:10  150 - 450 x10E9/L Final   07/31/2023 01:19  150 - 450 x10E9/L Final   05/19/2023 06:24  150 - 450 x10E9/L Final     No results found for: \"MPV\"  Neutrophils %   Date/Time Value Ref Range Status   07/31/2023 01:19 PM 77.7 40.0 - 80.0 % Final   05/18/2023 08:05 AM 70.7 40.0 - 80.0 % Final   05/17/2023 07:05 PM 77.0 40.0 - 80.0 % Final     Immature Granulocytes %, Automated   Date/Time Value Ref Range Status   07/31/2023 01:19 PM 0.3 0.0 - 0.9 % Final     Comment:      Immature Granulocyte Count (IG) includes promyelocytes,    myelocytes and metamyelocytes but does not include bands.   Percent differential counts (%) should be " "interpreted in the   context of the absolute cell counts (cells/L).     05/18/2023 08:05 AM 0.4 0.0 - 0.9 % Final     Comment:      Immature Granulocyte Count (IG) includes promyelocytes,    myelocytes and metamyelocytes but does not include bands.   Percent differential counts (%) should be interpreted in the   context of the absolute cell counts (cells/L).     05/17/2023 07:05 PM 0.3 0.0 - 0.9 % Final     Comment:      Immature Granulocyte Count (IG) includes promyelocytes,    myelocytes and metamyelocytes but does not include bands.   Percent differential counts (%) should be interpreted in the   context of the absolute cell counts (cells/L).       Lymphocytes %   Date/Time Value Ref Range Status   07/31/2023 01:19 PM 11.4 13.0 - 44.0 % Final   05/18/2023 08:05 AM 16.0 13.0 - 44.0 % Final   05/17/2023 07:05 PM 13.4 13.0 - 44.0 % Final     Monocytes %   Date/Time Value Ref Range Status   07/31/2023 01:19 PM 9.1 2.0 - 10.0 % Final   05/18/2023 08:05 AM 8.8 2.0 - 10.0 % Final   05/17/2023 07:05 PM 7.7 2.0 - 10.0 % Final     Eosinophils %   Date/Time Value Ref Range Status   07/31/2023 01:19 PM 1.2 0.0 - 6.0 % Final   05/18/2023 08:05 AM 3.7 0.0 - 6.0 % Final   05/17/2023 07:05 PM 1.3 0.0 - 6.0 % Final     Basophils %   Date/Time Value Ref Range Status   07/31/2023 01:19 PM 0.3 0.0 - 2.0 % Final   05/18/2023 08:05 AM 0.4 0.0 - 2.0 % Final   05/17/2023 07:05 PM 0.3 0.0 - 2.0 % Final     Neutrophils Absolute   Date/Time Value Ref Range Status   07/31/2023 01:19 PM 7.90 (H) 1.20 - 7.70 x10E9/L Final   05/18/2023 08:05 AM 5.90 1.20 - 7.70 x10E9/L Final   05/17/2023 07:05 PM 9.05 (H) 1.20 - 7.70 x10E9/L Final     No results found for: \"IGABSOL\"  Lymphocytes Absolute   Date/Time Value Ref Range Status   07/31/2023 01:19 PM 1.16 (L) 1.20 - 4.80 x10E9/L Final   05/18/2023 08:05 AM 1.33 1.20 - 4.80 x10E9/L Final   05/17/2023 07:05 PM 1.57 1.20 - 4.80 x10E9/L Final     Monocytes Absolute   Date/Time Value Ref Range Status " "  07/31/2023 01:19 PM 0.92 0.10 - 1.00 x10E9/L Final   05/18/2023 08:05 AM 0.73 0.10 - 1.00 x10E9/L Final   05/17/2023 07:05 PM 0.90 0.10 - 1.00 x10E9/L Final     Eosinophils Absolute   Date/Time Value Ref Range Status   07/31/2023 01:19 PM 0.12 0.00 - 0.70 x10E9/L Final   05/18/2023 08:05 AM 0.31 0.00 - 0.70 x10E9/L Final   05/17/2023 07:05 PM 0.15 0.00 - 0.70 x10E9/L Final     Basophils Absolute   Date/Time Value Ref Range Status   07/31/2023 01:19 PM 0.03 0.00 - 0.10 x10E9/L Final   05/18/2023 08:05 AM 0.03 0.00 - 0.10 x10E9/L Final   05/17/2023 07:05 PM 0.03 0.00 - 0.10 x10E9/L Final       No components found for: \"PT\"  aPTT   Date/Time Value Ref Range Status   05/18/2023 08:05 AM 32 26 - 39 sec Final     Comment:       THE APTT IS NO LONGER USED FOR MONITORING     UNFRACTIONATED HEPARIN THERAPY.    FOR MONITORING HEPARIN THERAPY,     USE THE HEPARIN ASSAY.     05/17/2023 07:05 PM 34 26 - 39 sec Final     Comment:       THE APTT IS NO LONGER USED FOR MONITORING     UNFRACTIONATED HEPARIN THERAPY.    FOR MONITORING HEPARIN THERAPY,     USE THE HEPARIN ASSAY.         Assessment/Plan    1) recurrent pulmonary emboli  -1st event was in 2013  -2nd event in 2018  -remains on indefinite anticoagulation with xarelto 10 mg daily (maintenance dosing)  -today CBC was checked: wbc 7.2, hgb 14, plt  217,000  -will refill xarelto 10 mg for another year    2) insomnia  -on zolpidem     3) polymyalgia rheumatica  -on prednisone  -follows with Dr Darby     4) vitamin D deficiency  -on calcium + D     Problem List Items Addressed This Visit    None  Visit Diagnoses         Codes    Recurrent pulmonary embolism (CMS/HCC)     I26.99    Relevant Orders    CBC and Auto Differential (Completed)    Clinic Appointment Request Follow Up; JAIRO COSBY; [id number]    Oncology Line Draw Appointment Request    CBC and Auto Differential                 Jairo Cosby MD                         "

## 2023-11-08 ENCOUNTER — HOSPITAL ENCOUNTER (EMERGENCY)
Facility: HOSPITAL | Age: 63
Discharge: HOME | End: 2023-11-08
Payer: COMMERCIAL

## 2023-11-08 ENCOUNTER — APPOINTMENT (OUTPATIENT)
Dept: RADIOLOGY | Facility: HOSPITAL | Age: 63
End: 2023-11-08
Payer: COMMERCIAL

## 2023-11-08 VITALS
WEIGHT: 225 LBS | SYSTOLIC BLOOD PRESSURE: 125 MMHG | TEMPERATURE: 97 F | HEIGHT: 70 IN | BODY MASS INDEX: 32.21 KG/M2 | RESPIRATION RATE: 18 BRPM | OXYGEN SATURATION: 98 % | HEART RATE: 82 BPM | DIASTOLIC BLOOD PRESSURE: 78 MMHG

## 2023-11-08 DIAGNOSIS — S76.312A STRAIN OF LEFT HAMSTRING MUSCLE, INITIAL ENCOUNTER: Primary | ICD-10-CM

## 2023-11-08 PROCEDURE — 99283 EMERGENCY DEPT VISIT LOW MDM: CPT | Mod: 25

## 2023-11-08 PROCEDURE — 99285 EMERGENCY DEPT VISIT HI MDM: CPT | Mod: 25

## 2023-11-08 PROCEDURE — 73502 X-RAY EXAM HIP UNI 2-3 VIEWS: CPT | Mod: LEFT SIDE | Performed by: RADIOLOGY

## 2023-11-08 PROCEDURE — 2500000001 HC RX 250 WO HCPCS SELF ADMINISTERED DRUGS (ALT 637 FOR MEDICARE OP)

## 2023-11-08 PROCEDURE — 73502 X-RAY EXAM HIP UNI 2-3 VIEWS: CPT | Mod: LT,FY

## 2023-11-08 PROCEDURE — 99285 EMERGENCY DEPT VISIT HI MDM: CPT

## 2023-11-08 RX ORDER — CYCLOBENZAPRINE HCL 10 MG
5 TABLET ORAL 3 TIMES DAILY
Qty: 15 TABLET | Refills: 0 | Status: SHIPPED | OUTPATIENT
Start: 2023-11-08 | End: 2023-11-18

## 2023-11-08 RX ORDER — CYCLOBENZAPRINE HCL 10 MG
10 TABLET ORAL ONCE
Status: COMPLETED | OUTPATIENT
Start: 2023-11-08 | End: 2023-11-08

## 2023-11-08 RX ORDER — IBUPROFEN 600 MG/1
600 TABLET ORAL ONCE
Status: COMPLETED | OUTPATIENT
Start: 2023-11-08 | End: 2023-11-08

## 2023-11-08 RX ADMIN — CYCLOBENZAPRINE 10 MG: 10 TABLET, FILM COATED ORAL at 20:22

## 2023-11-08 RX ADMIN — IBUPROFEN 600 MG: 600 TABLET, FILM COATED ORAL at 20:22

## 2023-11-08 ASSESSMENT — LIFESTYLE VARIABLES
EVER HAD A DRINK FIRST THING IN THE MORNING TO STEADY YOUR NERVES TO GET RID OF A HANGOVER: NO
EVER FELT BAD OR GUILTY ABOUT YOUR DRINKING: NO
REASON UNABLE TO ASSESS: NO
HAVE YOU EVER FELT YOU SHOULD CUT DOWN ON YOUR DRINKING: NO
HAVE PEOPLE ANNOYED YOU BY CRITICIZING YOUR DRINKING: NO

## 2023-11-08 ASSESSMENT — COLUMBIA-SUICIDE SEVERITY RATING SCALE - C-SSRS
1. IN THE PAST MONTH, HAVE YOU WISHED YOU WERE DEAD OR WISHED YOU COULD GO TO SLEEP AND NOT WAKE UP?: NO
6. HAVE YOU EVER DONE ANYTHING, STARTED TO DO ANYTHING, OR PREPARED TO DO ANYTHING TO END YOUR LIFE?: NO
2. HAVE YOU ACTUALLY HAD ANY THOUGHTS OF KILLING YOURSELF?: NO
5. HAVE YOU STARTED TO WORK OUT OR WORKED OUT THE DETAILS OF HOW TO KILL YOURSELF? DO YOU INTEND TO CARRY OUT THIS PLAN?: NO
4. HAVE YOU HAD THESE THOUGHTS AND HAD SOME INTENTION OF ACTING ON THEM?: NO

## 2023-11-08 ASSESSMENT — PAIN DESCRIPTION - LOCATION
LOCATION: HIP
LOCATION: HIP

## 2023-11-08 ASSESSMENT — PAIN DESCRIPTION - ORIENTATION: ORIENTATION: LEFT

## 2023-11-08 ASSESSMENT — PAIN - FUNCTIONAL ASSESSMENT
PAIN_FUNCTIONAL_ASSESSMENT: 0-10
PAIN_FUNCTIONAL_ASSESSMENT: 0-10

## 2023-11-08 ASSESSMENT — PAIN DESCRIPTION - ONSET: ONSET: ONGOING

## 2023-11-08 ASSESSMENT — PAIN DESCRIPTION - PAIN TYPE: TYPE: ACUTE PAIN

## 2023-11-08 ASSESSMENT — PAIN DESCRIPTION - DESCRIPTORS: DESCRIPTORS: ACHING

## 2023-11-08 ASSESSMENT — PAIN SCALES - GENERAL
PAINLEVEL_OUTOF10: 10 - WORST POSSIBLE PAIN
PAINLEVEL_OUTOF10: 10 - WORST POSSIBLE PAIN

## 2023-11-08 ASSESSMENT — PAIN DESCRIPTION - FREQUENCY: FREQUENCY: CONSTANT/CONTINUOUS

## 2023-11-08 ASSESSMENT — PAIN DESCRIPTION - PROGRESSION: CLINICAL_PROGRESSION: NOT CHANGED

## 2023-11-09 ENCOUNTER — OFFICE VISIT (OUTPATIENT)
Dept: ORTHOPEDIC SURGERY | Facility: CLINIC | Age: 63
End: 2023-11-09
Payer: COMMERCIAL

## 2023-11-09 VITALS — WEIGHT: 225 LBS | HEIGHT: 70 IN | BODY MASS INDEX: 32.21 KG/M2

## 2023-11-09 DIAGNOSIS — S76.312A HAMSTRING TENDON RUPTURE, LEFT, INITIAL ENCOUNTER: Primary | ICD-10-CM

## 2023-11-09 PROCEDURE — 99213 OFFICE O/P EST LOW 20 MIN: CPT | Performed by: ORTHOPAEDIC SURGERY

## 2023-11-09 PROCEDURE — 99203 OFFICE O/P NEW LOW 30 MIN: CPT | Performed by: ORTHOPAEDIC SURGERY

## 2023-11-09 PROCEDURE — 1036F TOBACCO NON-USER: CPT | Performed by: ORTHOPAEDIC SURGERY

## 2023-11-09 RX ORDER — HYDROCODONE BITARTRATE AND ACETAMINOPHEN 5; 325 MG/1; MG/1
1 TABLET ORAL EVERY 6 HOURS PRN
Qty: 20 TABLET | Refills: 0 | Status: SHIPPED | OUTPATIENT
Start: 2023-11-09 | End: 2023-11-16

## 2023-11-09 ASSESSMENT — PAIN SCALES - GENERAL: PAINLEVEL_OUTOF10: 9

## 2023-11-09 NOTE — ED PROVIDER NOTES
Emergency Department Encounter  [facility] EMERGENCY MEDICINE    Patient: Anton Whitt  MRN: [id number]  : 1960  Date of Evaluation: 2023  ED Provider: LIU Couch      Chief Complaint       Chief Complaint   Patient presents with    Hip Pain     Left hip pain after moving a piano     [place]    (Location/Symptom, Timing/Onset, Context/Setting, Quality, Duration, Modifying Factors, Severity) Note limiting factors.   Limitations to History: None  Historian: Patient  Records reviewed: EMR inpatient and outpatient notes, Care Everywhere      Anton Whitt is a 63 y.o. male with past medical history of DVT on Xarelto, DONAVAN, PMR and sigmoid diverticulitis, who presents to the emergency department complaining of left hamstring pain.  He states today around 3 PM, while moving a piano, felt a pop and had a severe intense pain to the left hamstring.  He states has pain to the left posterior thigh at rest and with movement.  He is usually utilizing crutches to ambulate.  He reports intermittent numbness, tingling down left extremity.  He denies fever, decrease in sensation/motor, discoloration in skin,  ROS:     Review of Systems  14 systems reviewed and otherwise acutely negative except as in the [place].          Past History     Past Medical History:   Diagnosis Date    Acute embolism and thrombosis of unspecified deep veins of unspecified lower extremity (CMS/[facility]) 2013    Acute deep vein thrombosis of lower extremity    Acute upper respiratory infection, unspecified 2018    Acute URI    Body mass index (BMI) 35.0-35.9, adult 2018    BMI 35.0-35.9,adult    Encounter for other preprocedural examination 2015    Preop testing    Encounter for other preprocedural examination 2015    Preop examination    Encounter for screening for cardiovascular disorders     Encounter for screening for cardiovascular disorders    Insect bite (nonvenomous) of lower back and  pelvis, initial encounter 08/05/2014    Tick bite of back    Ocular pain, left eye 09/07/2018    Pain of left eye    Other conditions influencing health status 03/21/2017    Foot pain, unspecified laterality    Other polyuria 05/29/2013    Polyuria    Other shoulder lesions, left shoulder 04/11/2016    Tendinitis of left rotator cuff    Other shoulder lesions, unspecified shoulder 03/13/2013    Rotator cuff tendonitis    Other specified soft tissue disorders 03/21/2017    Leg swelling    Pain in left knee 08/23/2019    Left knee pain    Pain in unspecified knee 12/09/2014    Acute knee pain    Personal history of other diseases of the circulatory system 01/20/2020    History of hypertension    Personal history of other diseases of the circulatory system     History of hypertension    Personal history of other diseases of the nervous system and sense organs 05/06/2015    History of otitis media    Personal history of other diseases of the nervous system and sense organs     History of sleep apnea    Personal history of other endocrine, nutritional and metabolic disease 04/14/2015    History of obesity    Personal history of other specified conditions 02/19/2020    History of nasal congestion    Personal history of other specified conditions 06/26/2013    History of fatigue    Personal history of other specified conditions     History of insomnia    Phlebitis and thrombophlebitis of superficial vessels of left lower extremity 03/21/2017    Thrombophlebitis of superficial veins of left lower extremity    Phlebitis and thrombophlebitis of superficial vessels of right lower extremity 03/21/2017    Thrombophlebitis of superficial veins of right lower extremity    Phlebitis and thrombophlebitis of unspecified site     Superficial thrombophlebitis     Past Surgical History:   Procedure Laterality Date    BACK SURGERY  10/26/2015    Lower Back Surgery    KNEE ARTHROSCOPY W/ DEBRIDEMENT  04/29/2013    Arthroscopy Knee     OTHER SURGICAL HISTORY  04/29/2013    Total Disc Arthroplasty Lumbar    OTHER SURGICAL HISTORY  10/26/2015    Laminectomy Decompressive More Than Two Lumbar Segments    OTHER SURGICAL HISTORY  03/09/2022    Back surgery     Social History     Socioeconomic History    Marital status:      Spouse name: Not on file    Number of children: Not on file    Years of education: Not on file    Highest education level: Not on file   Occupational History    Not on file   Tobacco Use    Smoking status: Never    Smokeless tobacco: Never   Vaping Use    Vaping Use: Never used   Substance and Sexual Activity    Alcohol use: Yes     Comment: social    Drug use: Never    Sexual activity: Not on file   Other Topics Concern    Not on file   Social History Narrative    Not on file     Social Determinants of Health     Financial Resource Strain: Not on file   Food Insecurity: Not on file   Transportation Needs: Not on file   Physical Activity: Not on file   Stress: Not on file   Social Connections: Not on file   Intimate Partner Violence: Not on file   Housing Stability: Not on file       Medications/Allergies     Previous Medications    ASPIRIN 81 MG EC TABLET    Take 1 tablet (81 mg) by mouth once daily.    CALCIUM CARBONATE-VITAMIN D3 600 MG-5 MCG (200 UNIT) TABLET    Take 1 tablet by mouth once daily.    CHOLECALCIFEROL (VITAMIN D-3) 25 MCG (1000 UT) TABLET    Take 1 tablet (25 mcg) by mouth once daily.    GABAPENTIN (NEURONTIN) 300 MG CAPSULE    TAKE 2 CAPSULES BY MOUTH THREE TIMES DAILY AS NEEDED    METHOCARBAMOL (ROBAXIN) 500 MG TABLET    Take by mouth.    OXYCODONE-ACETAMINOPHEN (PERCOCET) 5-325 MG TABLET    Take 1 tablet by mouth 3 times a day as needed.    PREDNISONE (DELTASONE) 1 MG TABLET    Take 2 tablets (2 mg) by mouth 2 times a day.    RIVAROXABAN (XARELTO) 10 MG TABLET    Take 1 tablet (10 mg) by mouth once daily.    TRAZODONE (DESYREL) 50 MG TABLET    Take 2 tablets (100 mg) by mouth once daily at bedtime.     TRAZODONE (DESYREL) 50 MG TABLET    Take 1 tablet (50 mg) by mouth once daily at bedtime.    ZOLPIDEM (AMBIEN) 5 MG TABLET    Take 1 tablet (5 mg) by mouth as needed at bedtime for sleep (prn).     Allergies   Allergen Reactions    Shellfish Containing Products Unknown     All seafood    Sulfamethoxazole-Trimethoprim Hives and Rash     Bactrim        Physical Exam       ED Triage Vitals [11/08/23 1726]   Temp Heart Rate Resp BP   36.1 °C (97 °F) 82 18 125/78      SpO2 Temp Source Heart Rate Source Patient Position   98 % Temporal Monitor Sitting      BP Location FiO2 (%)     Right arm --         Physical Exam  Vitals and nursing note reviewed.   Constitutional:       Appearance: Normal appearance.   HENT:      Head: Normocephalic.      Nose: Nose normal.      Mouth/Throat:      Mouth: Mucous membranes are moist.      Pharynx: Oropharynx is clear.   Eyes:      Extraocular Movements: Extraocular movements intact.      Pupils: Pupils are equal, round, and reactive to light.   Cardiovascular:      Rate and Rhythm: Normal rate and regular rhythm.      Pulses: Normal pulses.      Heart sounds: Normal heart sounds.   Pulmonary:      Effort: Pulmonary effort is normal.      Breath sounds: Normal breath sounds.   Abdominal:      General: Abdomen is flat. Bowel sounds are normal.      Palpations: Abdomen is soft.   Musculoskeletal:         General: Tenderness and signs of injury present. No swelling or deformity.      Cervical back: Normal range of motion and neck supple.      Right lower leg: No edema.      Left lower leg: No edema.      Comments: No deformity, bruising, open sores, swelling or erythema.  No midline or paraspinal deformity or tenderness.   Skin:     General: Skin is warm and dry.   Neurological:      General: No focal deficit present.      Mental Status: He is alert and oriented to person, place, and time.   Psychiatric:         Mood and Affect: Mood normal.         Behavior: Behavior normal.        Diagnostics   Labs:  Labs Reviewed - No data to display  Radiographs:  XR hip left 2 or 3 views    (Results Pending)       Procedures: N/A      EKG: N/A    Assessment/Plan   In brief, Anton Whitt is a 63 y.o. male who presented to the emergency department for left hamstring tenderness. See history above. Vitals reviewed, and within normal limits.  No acute distress noted  Medical work up includes x-ray of left hip.  Treatment plan included ibuprofen, Flexeril. Likely muscle strain, home-going with Flexeril and close follow-up with orthopedics.  Patient educated on RICE.  Patient educated to call orthopedics in the a.m. I discussed the differential, results and discharge plan with the patient and wife. I emphasized the importance of follow-up with the physician I referred them to in the timeframe recommended. I explained reasons for the patient to return to the clinic. Questions were addressed. They understand return precautions and discharge instructions. The patient and wife expressed understanding.    Medical Decision Making  Differential diagnoses include fracture, dislocation, Piriformis syndrome, Sacroiliac joint dysfunction, compartment syndrome.    Left hip x-ray showed no acute fracture or dislocation.  Less likely compartment syndrome, no swelling, erythema, bruising.  Posterior tibial and popliteal pulses present.    Likely muscle strain, home-going with Flexeril and close follow-up with orthopedics.  Patient educated to call orthopedics in the a.m.        Amount and/or Complexity of Data Reviewed  Labs:  Decision-making details documented in ED Course.         ED Course as of 11/08/23 2242   Wed Nov 08, 2023 1955 Cyclobenzaprine and ibuprofen ordered for pain [ED]   2210 XR hip left 2 or 3 views  Showed no acute fracture.  Does reveal degenerative changes that was seen on previous imaging. [ED]      ED Course User Index  [ED] Tashia Ruano, APRN-CNP         Diagnoses as of 11/08/23 2242   Strain  "of left hamstring muscle, initial encounter      Visit Vitals  /78 (BP Location: Right arm, Patient Position: Sitting)   Pulse 82   Temp 36.1 °C (97 °F) (Temporal)   Resp 18   Ht 1.778 m (5' 10\")   Wt 102 kg (225 lb)   SpO2 98%   BMI 32.28 kg/m²   Smoking Status Never   BSA 2.24 m²       Medications - No data to display      Final Impression    Strain of left hamstring muscle    DISPOSITION  Disposition: Discharge  Patient condition is: Stable    Comment: Please note this report has been produced using speech recognition software and may contain errors related to that system including errors in grammar, punctuation, and spelling, as well as words and phrases that may be inappropriate.  If there are any questions or concerns please feel free to contact the dictating provider for clarification.    LIU Couch  [facility]  Emergency department     LIU Couch  11/08/23 2246    "

## 2023-11-09 NOTE — PROGRESS NOTES
History of Present Illness  Patient presents for left hamstring injury today.  He states that on 11/8/2023 he was moving a babygran piano and felt a pop.  He denies any other injuries or surgeries to the hamstring.  Denies any numbness or tingling going down the leg.  He did go to the emergency room where x-rays were done and he was given ibuprofen and Flexeril.  He is on Eliquis.  Review of Systems   GENERAL: Negative for malaise, significant weight loss, fever  MUSCULOSKELETAL: see HPI  NEURO:  Negative     Physical Exam  Well-nourished, well-developed. No acute distress. Alert and oriented x3. Responds appropriately to questioning. Good mood. Normal affect.  Left hip: Full passive motion of the hip.  No impingement.  Patient mildly swollen and tender over the proximal hamstring.  He is ecchymotic.  Is a 4 out of 5 hamstring strength.  Limited strength and stability secondary to suspected rupture     Imaging  No acute fracture or dislocation of the left hip     Assessment   Full-thickness versus partial-thickness left proximal hamstring tear     Plan  1.  Plan to get an MRI ASAP of his left hip to further delineate nature location of potentially full-thickness proximal hamstring tear.  We discussed operative nonoperative options based on his imaging.  2.  Ice and crutches  3.  [place] for pain relief  Ran an OAR[facility] report for 7 out of 10 pain and prescribed Norco for the patient.  Follow-up after MRI for definitive plan    Past Medical History:   Diagnosis Date    Acute embolism and thrombosis of unspecified deep veins of unspecified lower extremity (CMS/HCC) 11/11/2013    Acute deep vein thrombosis of lower extremity    Acute upper respiratory infection, unspecified 04/25/2018    Acute URI    Body mass index (BMI) 35.0-35.9, adult 09/17/2018    BMI 35.0-35.9,adult    Encounter for other preprocedural examination 06/25/2015    Preop testing    Encounter for other preprocedural examination 06/25/2015    Preop  examination    Encounter for screening for cardiovascular disorders     Encounter for screening for cardiovascular disorders    Insect bite (nonvenomous) of lower back and pelvis, initial encounter 08/05/2014    Tick bite of back    Ocular pain, left eye 09/07/2018    Pain of left eye    Other conditions influencing health status 03/21/2017    Foot pain, unspecified laterality    Other polyuria 05/29/2013    Polyuria    Other shoulder lesions, left shoulder 04/11/2016    Tendinitis of left rotator cuff    Other shoulder lesions, unspecified shoulder 03/13/2013    Rotator cuff tendonitis    Other specified soft tissue disorders 03/21/2017    Leg swelling    Pain in left knee 08/23/2019    Left knee pain    Pain in unspecified knee 12/09/2014    Acute knee pain    Personal history of other diseases of the circulatory system 01/20/2020    History of hypertension    Personal history of other diseases of the circulatory system     History of hypertension    Personal history of other diseases of the nervous system and sense organs 05/06/2015    History of otitis media    Personal history of other diseases of the nervous system and sense organs     History of sleep apnea    Personal history of other endocrine, nutritional and metabolic disease 04/14/2015    History of obesity    Personal history of other specified conditions 02/19/2020    History of nasal congestion    Personal history of other specified conditions 06/26/2013    History of fatigue    Personal history of other specified conditions     History of insomnia    Phlebitis and thrombophlebitis of superficial vessels of left lower extremity 03/21/2017    Thrombophlebitis of superficial veins of left lower extremity    Phlebitis and thrombophlebitis of superficial vessels of right lower extremity 03/21/2017    Thrombophlebitis of superficial veins of right lower extremity    Phlebitis and thrombophlebitis of unspecified site     Superficial thrombophlebitis        Medication Documentation Review Audit       Reviewed by Becca Vaca RN (Registered Nurse) on 11/08/23 at 2242      Medication Order Taking? Sig Documenting Provider Last Dose Status   aspirin 81 mg EC tablet [id number]  Take 1 tablet (81 mg) by mouth once daily. Historical Provider, MD  Active   calcium carbonate-vitamin D3 600 mg-5 mcg (200 unit) tablet [id number]  Take 1 tablet by mouth once daily. Historical Provider, MD  Active   cholecalciferol (Vitamin D-3) 25 MCG (1000 UT) tablet [id number]  Take 1 tablet (25 mcg) by mouth once daily. Historical Provider, MD  Active   gabapentin (Neurontin) 300 mg capsule [id number]  TAKE 2 CAPSULES BY MOUTH THREE TIMES DAILY AS NEEDED Historical Provider, MD  Active   methocarbamol (Robaxin) 500 mg tablet [id number]  Take by mouth. Historical Provider, MD  Active   oxyCODONE-acetaminophen (Percocet) 5-325 mg tablet [id number]  Take 1 tablet by mouth 3 times a day as needed. Historical Provider, MD  Active   predniSONE (Deltasone) 1 mg tablet [id number]  Take 2 tablets (2 mg) by mouth 2 times a day. Adry Presley MD  Active   rivaroxaban (Xarelto) 10 mg tablet [id number]  Take 1 tablet (10 mg) by mouth once daily. Jairo Cosby MD  Active   traZODone (Desyrel) 50 mg tablet [id number]  Take 2 tablets (100 mg) by mouth once daily at bedtime. Historical Provider, MD  Active   traZODone (Desyrel) 50 mg tablet [id number]  Take 1 tablet (50 mg) by mouth once daily at bedtime. Adry Presley MD  Active   zolpidem (Ambien) 5 mg tablet [id number]  Take 1 tablet (5 mg) by mouth as needed at bedtime for sleep (prn). Adry Presley MD  Active                    Allergies   Allergen Reactions    Shellfish Containing Products Unknown     All seafood    Sulfamethoxazole-Trimethoprim Hives and Rash     Bactrim       Social History     Socioeconomic History    Marital status:      Spouse name: Not on file    Number of children: Not on file    Years of education: Not on file    Highest  education level: Not on file   Occupational History    Not on file   Tobacco Use    Smoking status: Never    Smokeless tobacco: Never   Vaping Use    Vaping Use: Never used   Substance and Sexual Activity    Alcohol use: Yes     Comment: social    Drug use: Never    Sexual activity: Not on file   Other Topics Concern    Not on file   Social History Narrative    Not on file     Social Determinants of Health     Financial Resource Strain: Not on file   Food Insecurity: Not on file   Transportation Needs: Not on file   Physical Activity: Not on file   Stress: Not on file   Social Connections: Not on file   Intimate Partner Violence: Not on file   Housing Stability: Not on file       Past Surgical History:   Procedure Laterality Date    BACK SURGERY  10/26/2015    Lower Back Surgery    KNEE ARTHROSCOPY W/ DEBRIDEMENT  04/29/2013    Arthroscopy Knee    OTHER SURGICAL HISTORY  04/29/2013    Total Disc Arthroplasty Lumbar    OTHER SURGICAL HISTORY  10/26/2015    Laminectomy Decompressive More Than Two Lumbar Segments    OTHER SURGICAL HISTORY  03/09/2022    Back surgery

## 2023-11-09 NOTE — DISCHARGE INSTRUCTIONS
Rest left leg.  Elevate left leg  May apply ice to posterior left leg  Take Flexeril and Tylenol as needed for discomfort.  Call orthopedics in the morning to schedule an appointment.

## 2023-11-11 ENCOUNTER — HOSPITAL ENCOUNTER (OUTPATIENT)
Dept: RADIOLOGY | Facility: HOSPITAL | Age: 63
Discharge: HOME | End: 2023-11-11
Payer: COMMERCIAL

## 2023-11-11 DIAGNOSIS — S76.312A HAMSTRING TENDON RUPTURE, LEFT, INITIAL ENCOUNTER: ICD-10-CM

## 2023-11-11 PROBLEM — I26.99 RECURRENT PULMONARY EMBOLISM (MULTI): Status: ACTIVE | Noted: 2023-11-11

## 2023-11-11 PROCEDURE — 73721 MRI JNT OF LWR EXTRE W/O DYE: CPT | Mod: LEFT SIDE | Performed by: RADIOLOGY

## 2023-11-11 PROCEDURE — 73721 MRI JNT OF LWR EXTRE W/O DYE: CPT | Mod: LT

## 2023-11-11 ASSESSMENT — ENCOUNTER SYMPTOMS
MUSCULOSKELETAL NEGATIVE: 1
HEMATOLOGIC/LYMPHATIC NEGATIVE: 1
CONSTITUTIONAL NEGATIVE: 1
ENDOCRINE NEGATIVE: 1
NEUROLOGICAL NEGATIVE: 1
CARDIOVASCULAR NEGATIVE: 1
PSYCHIATRIC NEGATIVE: 1
RESPIRATORY NEGATIVE: 1
GASTROINTESTINAL NEGATIVE: 1
EYES NEGATIVE: 1

## 2023-11-13 ENCOUNTER — OFFICE VISIT (OUTPATIENT)
Dept: ORTHOPEDIC SURGERY | Facility: CLINIC | Age: 63
End: 2023-11-13
Payer: COMMERCIAL

## 2023-11-13 DIAGNOSIS — S76.312A HAMSTRING TENDON RUPTURE, LEFT, INITIAL ENCOUNTER: Primary | ICD-10-CM

## 2023-11-13 PROCEDURE — 99214 OFFICE O/P EST MOD 30 MIN: CPT | Performed by: ORTHOPAEDIC SURGERY

## 2023-11-13 PROCEDURE — 1036F TOBACCO NON-USER: CPT | Performed by: ORTHOPAEDIC SURGERY

## 2023-11-13 NOTE — PROGRESS NOTES
History of Present Illness  Patient presents for left hamstring injury .  He states that on 11/8/2023 he was moving a babygran piano and felt a pop.  He denies any other injuries or surgeries to the hamstring.  Denies any numbness or tingling going down the leg.  Still having moderate amount of pain.  States he is able to weight-bear and put foot down flat.  Lifting it does cause moderate mount of a pain review of Systems   GENERAL: Negative for malaise, significant weight loss, fever  MUSCULOSKELETAL: see HPI  NEURO:  Negative     Physical Exam  Well-nourished, well-developed. No acute distress. Alert and oriented x3. Responds appropriately to questioning. Good mood. Normal affect.  Left hip: Full passive motion of the hip.  No impingement.  Patient mildly swollen and tender over the proximal hamstring.  He is ecchymotic.  Is a 4 out of 5 hamstring strength.  Limited strength and stability secondary to suspected rupture     Imaging  MRI left hip: High-grade partial to near full-thickness tear in the proximal hamstring.  No evidence of retraction.  Moderate edema     Assessment   Full-thickness/high-grade partial-thickness left proximal hamstring tear     Plan  1.  I recommended nonoperative care.  He will be partial weightbearing on crutches.  We discussed with him overall a 3 to 6-month recovery in terms of muscle muscle strength and conditioning.  2.  Ice and crutches  3.  [place] for pain relief  Ran an [facility] report for 7 out of 10 pain and prescribed Norco for the patient.  Follow-up 4 to 6 weeks    Past Medical History:   Diagnosis Date    Acute embolism and thrombosis of unspecified deep veins of unspecified lower extremity (CMS/[facility]) 11/11/2013    Acute deep vein thrombosis of lower extremity    Acute upper respiratory infection, unspecified 04/25/2018    Acute URI    Body mass index (BMI) 35.0-35.9, adult 09/17/2018    BMI 35.0-35.9,adult    Encounter for other preprocedural examination 06/25/2015    Preop  testing    Encounter for other preprocedural examination 06/25/2015    Preop examination    Encounter for screening for cardiovascular disorders     Encounter for screening for cardiovascular disorders    Insect bite (nonvenomous) of lower back and pelvis, initial encounter 08/05/2014    Tick bite of back    Ocular pain, left eye 09/07/2018    Pain of left eye    Other conditions influencing health status 03/21/2017    Foot pain, unspecified laterality    Other polyuria 05/29/2013    Polyuria    Other shoulder lesions, left shoulder 04/11/2016    Tendinitis of left rotator cuff    Other shoulder lesions, unspecified shoulder 03/13/2013    Rotator cuff tendonitis    Other specified soft tissue disorders 03/21/2017    Leg swelling    Pain in left knee 08/23/2019    Left knee pain    Pain in unspecified knee 12/09/2014    Acute knee pain    Personal history of other diseases of the circulatory system 01/20/2020    History of hypertension    Personal history of other diseases of the circulatory system     History of hypertension    Personal history of other diseases of the nervous system and sense organs 05/06/2015    History of otitis media    Personal history of other diseases of the nervous system and sense organs     History of sleep apnea    Personal history of other endocrine, nutritional and metabolic disease 04/14/2015    History of obesity    Personal history of other specified conditions 02/19/2020    History of nasal congestion    Personal history of other specified conditions 06/26/2013    History of fatigue    Personal history of other specified conditions     History of insomnia    Phlebitis and thrombophlebitis of superficial vessels of left lower extremity 03/21/2017    Thrombophlebitis of superficial veins of left lower extremity    Phlebitis and thrombophlebitis of superficial vessels of right lower extremity 03/21/2017    Thrombophlebitis of superficial veins of right lower extremity    Phlebitis and  thrombophlebitis of unspecified site     Superficial thrombophlebitis       Medication Documentation Review Audit       Reviewed by Becca Vaca RN (Registered Nurse) on 11/08/23 at 2242      Medication Order Taking? Sig Documenting Provider Last Dose Status   aspirin 81 mg EC tablet [id number]  Take 1 tablet (81 mg) by mouth once daily. Historical Provider, MD  Active   calcium carbonate-vitamin D3 600 mg-5 mcg (200 unit) tablet [id number]  Take 1 tablet by mouth once daily. Historical Provider, MD  Active   cholecalciferol (Vitamin D-3) 25 MCG (1000 UT) tablet [id number]  Take 1 tablet (25 mcg) by mouth once daily. Historical Provider, MD  Active   gabapentin (Neurontin) 300 mg capsule [id number]  TAKE 2 CAPSULES BY MOUTH THREE TIMES DAILY AS NEEDED Historical Provider, MD  Active   methocarbamol (Robaxin) 500 mg tablet [id number]  Take by mouth. Historical Provider, MD  Active   oxyCODONE-acetaminophen (Percocet) 5-325 mg tablet [id number]  Take 1 tablet by mouth 3 times a day as needed. Historical Provider, MD  Active   predniSONE (Deltasone) 1 mg tablet [id number]  Take 2 tablets (2 mg) by mouth 2 times a day. Adry Presley MD  Active   rivaroxaban (Xarelto) 10 mg tablet [id number]  Take 1 tablet (10 mg) by mouth once daily. Jairo Cosby MD  Active   traZODone (Desyrel) 50 mg tablet [id number]  Take 2 tablets (100 mg) by mouth once daily at bedtime. Historical Provider, MD  Active   traZODone (Desyrel) 50 mg tablet [id number]  Take 1 tablet (50 mg) by mouth once daily at bedtime. Adry Presley MD  Active   zolpidem (Ambien) 5 mg tablet [id number]  Take 1 tablet (5 mg) by mouth as needed at bedtime for sleep (prn). Adry Presley MD  Active                    Allergies   Allergen Reactions    Shellfish Containing Products Unknown     All seafood    Sulfamethoxazole-Trimethoprim Hives and Rash     Bactrim       Social History     Socioeconomic History    Marital status:      Spouse name: Not on file    Number  of children: Not on file    Years of education: Not on file    Highest education level: Not on file   Occupational History    Not on file   Tobacco Use    Smoking status: Never    Smokeless tobacco: Never   Vaping Use    Vaping Use: Never used   Substance and Sexual Activity    Alcohol use: Yes     Comment: social    Drug use: Never    Sexual activity: Not on file   Other Topics Concern    Not on file   Social History Narrative    Not on file     Social Determinants of Health     Financial Resource Strain: Not on file   Food Insecurity: Not on file   Transportation Needs: Not on file   Physical Activity: Not on file   Stress: Not on file   Social Connections: Not on file   Intimate Partner Violence: Not on file   Housing Stability: Not on file       Past Surgical History:   Procedure Laterality Date    BACK SURGERY  10/26/2015    Lower Back Surgery    KNEE ARTHROSCOPY W/ DEBRIDEMENT  04/29/2013    Arthroscopy Knee    OTHER SURGICAL HISTORY  04/29/2013    Total Disc Arthroplasty Lumbar    OTHER SURGICAL HISTORY  10/26/2015    Laminectomy Decompressive More Than Two Lumbar Segments    OTHER SURGICAL HISTORY  03/09/2022    Back surgery

## 2023-11-27 ENCOUNTER — OFFICE VISIT (OUTPATIENT)
Dept: PRIMARY CARE | Facility: CLINIC | Age: 63
End: 2023-11-27
Payer: COMMERCIAL

## 2023-11-27 VITALS
HEART RATE: 75 BPM | BODY MASS INDEX: 32.16 KG/M2 | SYSTOLIC BLOOD PRESSURE: 118 MMHG | WEIGHT: 224.6 LBS | HEIGHT: 70 IN | OXYGEN SATURATION: 96 % | DIASTOLIC BLOOD PRESSURE: 78 MMHG

## 2023-11-27 DIAGNOSIS — E78.5 HYPERLIPIDEMIA, UNSPECIFIED HYPERLIPIDEMIA TYPE: Primary | ICD-10-CM

## 2023-11-27 DIAGNOSIS — F51.01 PRIMARY INSOMNIA: ICD-10-CM

## 2023-11-27 DIAGNOSIS — Z23 FLU VACCINE NEED: ICD-10-CM

## 2023-11-27 DIAGNOSIS — M35.3 POLYMYALGIA RHEUMATICA (MULTI): ICD-10-CM

## 2023-11-27 PROCEDURE — 90686 IIV4 VACC NO PRSV 0.5 ML IM: CPT | Performed by: INTERNAL MEDICINE

## 2023-11-27 PROCEDURE — 90471 IMMUNIZATION ADMIN: CPT | Performed by: INTERNAL MEDICINE

## 2023-11-27 PROCEDURE — 99214 OFFICE O/P EST MOD 30 MIN: CPT | Performed by: INTERNAL MEDICINE

## 2023-11-27 PROCEDURE — 1036F TOBACCO NON-USER: CPT | Performed by: INTERNAL MEDICINE

## 2023-11-27 RX ORDER — ZOLPIDEM TARTRATE 5 MG/1
5 TABLET ORAL NIGHTLY PRN
Qty: 90 TABLET | Refills: 0 | Status: SHIPPED | OUTPATIENT
Start: 2023-11-27 | End: 2024-02-23

## 2023-11-27 ASSESSMENT — ENCOUNTER SYMPTOMS
COUGH: 0
SHORTNESS OF BREATH: 0
APPETITE CHANGE: 0
PALPITATIONS: 0
ACTIVITY CHANGE: 0

## 2023-11-27 ASSESSMENT — PATIENT HEALTH QUESTIONNAIRE - PHQ9
SUM OF ALL RESPONSES TO PHQ9 QUESTIONS 1 AND 2: 0
2. FEELING DOWN, DEPRESSED OR HOPELESS: NOT AT ALL
1. LITTLE INTEREST OR PLEASURE IN DOING THINGS: NOT AT ALL

## 2023-11-27 NOTE — PROGRESS NOTES
Subjective   Patient ID: Anton Whitt is a 63 y.o. male who presents for 3 month follow up.    Tore his Hamstring 3 weeks ago was moving a baby grand- Went to [facility] ER - saw Orthopedics Dr Conti  Waiting for it to heal - supposed to be on crutches - only uses them for long distances    Feels well on the Prednisone 2mg a day    No diverticulitis symptoms        Patient presents today for 3 month follow up on insomnia. Patient is currently taking Zolpidem with out any complications and states that it works well for him.     Colonoscopy 7/17/2023      Review of Systems   Constitutional:  Negative for activity change and appetite change.   Respiratory:  Negative for cough and shortness of breath.    Cardiovascular:  Negative for chest pain, palpitations and leg swelling.       Objective   There were no vitals taken for this visit.    Physical Exam  Vitals and nursing note reviewed.   Cardiovascular:      Rate and Rhythm: Normal rate and regular rhythm.      Heart sounds: Normal heart sounds.   Pulmonary:      Effort: Pulmonary effort is normal.      Breath sounds: Normal breath sounds.   Musculoskeletal:      Cervical back: Normal range of motion.   Neurological:      Mental Status: He is alert.         Assessment/Plan   Problem List Items Addressed This Visit    None     Insomnia    Relevant Medications    zolpidem (Ambien) 5 mg tablet    DONAVAN (obstructive sleep apnea)    Current Assessment & Plan     He would like to see a dentist for a mouth guard - does not want to use CPAP         Polymyalgia rheumatica (CMS/HCC) - Primary    Current Assessment & Plan     On Prednisone 2mg daily          Relevant Medications    predniSONE (Deltasone) 1 mg tablet        Hamstring tear - per Ortho        History of pulmonary embolism    Current Assessment & Plan     On Xarelto        Recurrent Diverticulitis  Stable for now    Follow up with me in 3 months

## 2024-02-15 ENCOUNTER — HOSPITAL ENCOUNTER (OUTPATIENT)
Dept: RADIOLOGY | Facility: CLINIC | Age: 64
Discharge: HOME | End: 2024-02-15
Payer: COMMERCIAL

## 2024-02-15 ENCOUNTER — OFFICE VISIT (OUTPATIENT)
Dept: ORTHOPEDIC SURGERY | Facility: CLINIC | Age: 64
End: 2024-02-15
Payer: COMMERCIAL

## 2024-02-15 VITALS — WEIGHT: 230 LBS | BODY MASS INDEX: 32.93 KG/M2 | HEIGHT: 70 IN

## 2024-02-15 DIAGNOSIS — M25.562 ARTHRALGIA OF BOTH KNEES: ICD-10-CM

## 2024-02-15 DIAGNOSIS — M25.561 ARTHRALGIA OF BOTH KNEES: ICD-10-CM

## 2024-02-15 DIAGNOSIS — M17.0 PRIMARY OSTEOARTHRITIS OF BOTH KNEES: Primary | ICD-10-CM

## 2024-02-15 PROCEDURE — 73564 X-RAY EXAM KNEE 4 OR MORE: CPT | Mod: BILATERAL PROCEDURE | Performed by: RADIOLOGY

## 2024-02-15 PROCEDURE — 99214 OFFICE O/P EST MOD 30 MIN: CPT | Performed by: ORTHOPAEDIC SURGERY

## 2024-02-15 PROCEDURE — 2500000004 HC RX 250 GENERAL PHARMACY W/ HCPCS (ALT 636 FOR OP/ED): Performed by: ORTHOPAEDIC SURGERY

## 2024-02-15 PROCEDURE — 2500000005 HC RX 250 GENERAL PHARMACY W/O HCPCS: Performed by: ORTHOPAEDIC SURGERY

## 2024-02-15 PROCEDURE — 1036F TOBACCO NON-USER: CPT | Performed by: ORTHOPAEDIC SURGERY

## 2024-02-15 PROCEDURE — 73564 X-RAY EXAM KNEE 4 OR MORE: CPT | Mod: 50

## 2024-02-15 RX ORDER — METHYLPREDNISOLONE 4 MG/1
TABLET ORAL
Qty: 1 EACH | Refills: 0 | Status: SHIPPED | OUTPATIENT
Start: 2024-02-15 | End: 2024-04-01 | Stop reason: ALTCHOICE

## 2024-02-15 RX ORDER — CELECOXIB 200 MG/1
200 CAPSULE ORAL DAILY
Qty: 30 CAPSULE | Refills: 0 | Status: SHIPPED | OUTPATIENT
Start: 2024-02-15 | End: 2024-03-16

## 2024-02-15 RX ORDER — CYCLOBENZAPRINE HCL 5 MG
5 TABLET ORAL 3 TIMES DAILY PRN
Qty: 30 TABLET | Refills: 0 | Status: SHIPPED | OUTPATIENT
Start: 2024-02-15 | End: 2024-04-01 | Stop reason: ALTCHOICE

## 2024-02-15 NOTE — PROGRESS NOTES
History of Present Illness   Chief Complaint   Patient presents with    Left Knee - Pain     B/L Knee pain - X-Rays Today - Chronic Pain, Prior Cortisone Inj ~ 3 Mths Ago w/ Dr. Aragon    Right Knee - Pain     B/L Knee pain - X-Rays Today       History of Present Illness   Patient presents with bilateral knee pain for several years.  The patient localizes the pain diffusely.  Pain is moderate, achy, diffuse.  She has had a cortisone injection may be 3 years ago or so.  Recently has been bugging her more consistent with repetitive use and activities.  Does occasionally get muscle spasms taking Flexeril for this which has helped  Imaging  Knee: Radiographs  Moderate medial compartment arthritis grade 3 with significant joint space narrowing        Assessment:   Bilateral knee arthritis flare    Plan:  We reviewed the role of imaging, physical therapy, injections and the time frame to healing and correlation with outcome.  1.  Medrol Dosepak: Medication benefits and risks discussed  2.  NSAID: Celebrex sent to the pharmacy.  GI side effects and medical risks discussed  3.  Ice: 30 minutes on and off  4.  Exercise home program: Medically directed knee therapy / Handout given  5.  Injection options discussed  6.  Flexeril for muscle spasms  L Inj/Asp: R knee on 2/19/2024 2:40 AM  Indications: pain  Details: 22 G needle, anterolateral approach  Medications: 8 mL lidocaine 10 mg/mL (1 %); 40 mg triamcinolone acetonide 40 mg/mL  Outcome: tolerated well, no immediate complications  Procedure, treatment alternatives, risks and benefits explained, specific risks discussed. Consent was given by the patient. Immediately prior to procedure a time out was called to verify the correct patient, procedure, equipment, support staff and site/side marked as required. Patient was prepped and draped in the usual sterile fashion.       L Inj/Asp: L knee on 2/19/2024 2:40 AM  Indications: pain  Details: 22 G needle, anterolateral  approach  Medications: 40 mg triamcinolone acetonide 40 mg/mL; 8 mL lidocaine 10 mg/mL (1 %)  Outcome: tolerated well, no immediate complications  Procedure, treatment alternatives, risks and benefits explained, specific risks discussed. Consent was given by the patient. Immediately prior to procedure a time out was called to verify the correct patient, procedure, equipment, support staff and site/side marked as required. Patient was prepped and draped in the usual sterile fashion.             Physical Exam  Well-nourished, well-developed. No acute distress. Alert and oriented x3. Responds appropriately to questioning. Good mood. Normal affect.  Physical Exam  Bilateral knee:  Skin healthy and intact  No gross swelling or ecchymosis  Trace Effusion:   ROM: Right 110, left 110  Crepitance with range of motion  No pain with internal rotation of the hip  Tenderness to palpation over medial and lateral joint line and with patellar compression    Mild laxity to valgus stress/varus stress  Negative Lachman´s test  Positive Augustine´s test/Apley Grind  Neurovascular exam normal distally  Palpable pedal pulse and good cap refill     Review of Systems   GENERAL: Negative for malaise, significant weight loss, fever  MUSCULOSKELETAL: See HPI  NEURO:  Negative     Past Medical History:   Diagnosis Date    Acute embolism and thrombosis of unspecified deep veins of unspecified lower extremity (CMS/HCC) 11/11/2013    Acute deep vein thrombosis of lower extremity    Acute upper respiratory infection, unspecified 04/25/2018    Acute URI    Body mass index (BMI) 35.0-35.9, adult 09/17/2018    BMI 35.0-35.9,adult    Encounter for other preprocedural examination 06/25/2015    Preop testing    Encounter for other preprocedural examination 06/25/2015    Preop examination    Encounter for screening for cardiovascular disorders     Encounter for screening for cardiovascular disorders    Insect bite (nonvenomous) of lower back and pelvis,  initial encounter 08/05/2014    Tick bite of back    Ocular pain, left eye 09/07/2018    Pain of left eye    Other conditions influencing health status 03/21/2017    Foot pain, unspecified laterality    Other polyuria 05/29/2013    Polyuria    Other shoulder lesions, left shoulder 04/11/2016    Tendinitis of left rotator cuff    Other shoulder lesions, unspecified shoulder 03/13/2013    Rotator cuff tendonitis    Other specified soft tissue disorders 03/21/2017    Leg swelling    Pain in left knee 08/23/2019    Left knee pain    Pain in unspecified knee 12/09/2014    Acute knee pain    Personal history of other diseases of the circulatory system 01/20/2020    History of hypertension    Personal history of other diseases of the circulatory system     History of hypertension    Personal history of other diseases of the nervous system and sense organs 05/06/2015    History of otitis media    Personal history of other diseases of the nervous system and sense organs     History of sleep apnea    Personal history of other endocrine, nutritional and metabolic disease 04/14/2015    History of obesity    Personal history of other specified conditions 02/19/2020    History of nasal congestion    Personal history of other specified conditions 06/26/2013    History of fatigue    Personal history of other specified conditions     History of insomnia    Phlebitis and thrombophlebitis of superficial vessels of left lower extremity 03/21/2017    Thrombophlebitis of superficial veins of left lower extremity    Phlebitis and thrombophlebitis of superficial vessels of right lower extremity 03/21/2017    Thrombophlebitis of superficial veins of right lower extremity    Phlebitis and thrombophlebitis of unspecified site     Superficial thrombophlebitis       Medication Documentation Review Audit       Reviewed by Adry Presley MD (Physician) on 11/27/23 at 0908      Medication Order Taking? Sig Documenting Provider Last Dose Status    aspirin 81 mg EC tablet [id number] Yes Take 1 tablet (81 mg) by mouth once daily. Historical Provider, MD Taking Active   calcium carbonate-vitamin D3 600 mg-5 mcg (200 unit) tablet [id number] Yes Take 1 tablet by mouth once daily. Historical Provider, MD Taking Active   cholecalciferol (Vitamin D-3) 25 MCG (1000 UT) tablet [id number] Yes Take 1 tablet (25 mcg) by mouth once daily. Historical Provider, MD Taking Active   cyclobenzaprine (Flexeril) 10 mg tablet [id number]  Take 0.5 tablets (5 mg) by mouth 3 times a day for 10 days. Tashia Dreams, APRN-CNP   23 2359   gabapentin (Neurontin) 300 mg capsule [id number] Yes TAKE 2 CAPSULES BY MOUTH THREE TIMES DAILY AS NEEDED Historical Provider, MD Taking Active   methocarbamol (Robaxin) 500 mg tablet [id number] Yes Take by mouth. Historical Provider, MD Taking Active   oxyCODONE-acetaminophen (Percocet) 5-325 mg tablet [id number] Yes Take 1 tablet by mouth 3 times a day as needed. Historical Provider, MD Taking Active   predniSONE (Deltasone) 1 mg tablet [id number] Yes Take 2 tablets (2 mg) by mouth 2 times a day. Adry Presley MD Taking Active   rivaroxaban (Xarelto) 10 mg tablet [id number] Yes Take 1 tablet (10 mg) by mouth once daily. Jairo Cosby MD Taking Active   traZODone (Desyrel) 50 mg tablet [id number] Yes Take 2 tablets (100 mg) by mouth once daily at bedtime. Aaliyah Provider, MD Taking Active   traZODone (Desyrel) 50 mg tablet [id number] Yes Take 1 tablet (50 mg) by mouth once daily at bedtime. Adry Presley MD Taking Active   Discontinued 23 0907   zolpidem (Ambien) 5 mg tablet [id number]  Take 1 tablet (5 mg) by mouth as needed at bedtime for sleep (prn). Adry Presley MD  Active                    Allergies   Allergen Reactions    Shellfish Containing Products Unknown     All seafood    Sulfamethoxazole-Trimethoprim Hives and Rash     Bactrim       Social History     Socioeconomic History    Marital status:      Spouse name: Not on  file    Number of children: Not on file    Years of education: Not on file    Highest education level: Not on file   Occupational History    Not on file   Tobacco Use    Smoking status: Never    Smokeless tobacco: Never   Vaping Use    Vaping Use: Never used   Substance and Sexual Activity    Alcohol use: Yes     Comment: social    Drug use: Never    Sexual activity: Not on file   Other Topics Concern    Not on file   Social History Narrative    Not on file     Social Determinants of Health     Financial Resource Strain: Not on file   Food Insecurity: Not on file   Transportation Needs: Not on file   Physical Activity: Not on file   Stress: Not on file   Social Connections: Not on file   Intimate Partner Violence: Not on file   Housing Stability: Not on file       Past Surgical History:   Procedure Laterality Date    BACK SURGERY  10/26/2015    Lower Back Surgery    KNEE ARTHROSCOPY W/ DEBRIDEMENT  04/29/2013    Arthroscopy Knee    OTHER SURGICAL HISTORY  04/29/2013    Total Disc Arthroplasty Lumbar    OTHER SURGICAL HISTORY  10/26/2015    Laminectomy Decompressive More Than Two Lumbar Segments    OTHER SURGICAL HISTORY  03/09/2022    Back surgery       No results found.

## 2024-02-19 PROCEDURE — 20610 DRAIN/INJ JOINT/BURSA W/O US: CPT | Performed by: ORTHOPAEDIC SURGERY

## 2024-02-19 RX ORDER — TRIAMCINOLONE ACETONIDE 40 MG/ML
40 INJECTION, SUSPENSION INTRA-ARTICULAR; INTRAMUSCULAR
Status: COMPLETED | OUTPATIENT
Start: 2024-02-19 | End: 2024-02-19

## 2024-02-19 RX ORDER — LIDOCAINE HYDROCHLORIDE 10 MG/ML
8 INJECTION INFILTRATION; PERINEURAL
Status: COMPLETED | OUTPATIENT
Start: 2024-02-19 | End: 2024-02-19

## 2024-02-19 RX ADMIN — TRIAMCINOLONE ACETONIDE 40 MG: 40 INJECTION, SUSPENSION INTRA-ARTICULAR; INTRAMUSCULAR at 02:40

## 2024-02-19 RX ADMIN — LIDOCAINE HYDROCHLORIDE 8 ML: 10 INJECTION, SOLUTION INFILTRATION; PERINEURAL at 02:40

## 2024-02-23 DIAGNOSIS — F51.01 PRIMARY INSOMNIA: ICD-10-CM

## 2024-02-23 RX ORDER — ZOLPIDEM TARTRATE 5 MG/1
TABLET ORAL
Qty: 90 TABLET | Refills: 0 | Status: SHIPPED | OUTPATIENT
Start: 2024-02-23 | End: 2024-04-01 | Stop reason: SDUPTHER

## 2024-02-26 DIAGNOSIS — G47.00 INSOMNIA, UNSPECIFIED TYPE: ICD-10-CM

## 2024-02-26 RX ORDER — TRAZODONE HYDROCHLORIDE 50 MG/1
50 TABLET ORAL NIGHTLY
Qty: 180 TABLET | Refills: 0 | Status: SHIPPED | OUTPATIENT
Start: 2024-02-26 | End: 2024-04-19 | Stop reason: SDUPTHER

## 2024-02-29 ENCOUNTER — APPOINTMENT (OUTPATIENT)
Dept: ORTHOPEDIC SURGERY | Facility: CLINIC | Age: 64
End: 2024-02-29
Payer: COMMERCIAL

## 2024-03-14 ENCOUNTER — APPOINTMENT (OUTPATIENT)
Dept: ORTHOPEDIC SURGERY | Facility: CLINIC | Age: 64
End: 2024-03-14
Payer: COMMERCIAL

## 2024-03-28 ENCOUNTER — OFFICE VISIT (OUTPATIENT)
Dept: ORTHOPEDIC SURGERY | Facility: CLINIC | Age: 64
End: 2024-03-28
Payer: COMMERCIAL

## 2024-03-28 DIAGNOSIS — M17.0 PRIMARY OSTEOARTHRITIS OF BOTH KNEES: Primary | ICD-10-CM

## 2024-03-28 PROBLEM — I80.01 THROMBOPHLEBITIS OF SUPERFICIAL VEINS OF RIGHT LOWER EXTREMITY: Status: ACTIVE | Noted: 2024-03-28

## 2024-03-28 PROBLEM — M75.82 TENDINITIS OF LEFT ROTATOR CUFF: Status: ACTIVE | Noted: 2024-03-28

## 2024-03-28 PROBLEM — S76.319A STRAIN OF HAMSTRING MUSCLE: Status: ACTIVE | Noted: 2024-03-28

## 2024-03-28 PROBLEM — E66.9 OBESITY: Status: ACTIVE | Noted: 2024-03-28

## 2024-03-28 PROBLEM — I10 HYPERTENSION: Status: ACTIVE | Noted: 2024-03-28

## 2024-03-28 PROBLEM — H66.90 OTITIS MEDIA: Status: ACTIVE | Noted: 2024-03-28

## 2024-03-28 PROBLEM — L03.90 CELLULITIS: Status: ACTIVE | Noted: 2022-06-23

## 2024-03-28 PROBLEM — R09.81 NASAL CONGESTION: Status: ACTIVE | Noted: 2024-03-28

## 2024-03-28 PROCEDURE — 99213 OFFICE O/P EST LOW 20 MIN: CPT | Performed by: ORTHOPAEDIC SURGERY

## 2024-03-28 PROCEDURE — 1036F TOBACCO NON-USER: CPT | Performed by: ORTHOPAEDIC SURGERY

## 2024-03-28 NOTE — PROGRESS NOTES
History of Present Illness   Chief Complaint   Patient presents with    Left Knee - Follow-up     Oa s/p diana inj 2/15/24    Right Knee - Follow-up     Oa s/p diana inj 2/15/24       History of Present Illness   Patient presents with bilateral knee pain for several years.  The patient localizes the pain diffusely.  Pain is moderate, achy, diffuse.  She has had a cortisone injection may be 3 years ago or so.  Overall knees are definitely no better.  Injections have helped about 60% relief.  He is on a oral steroid for PMR.    imaging  Knee: Radiographs  Moderate medial compartment arthritis grade 3 with significant joint space narrowing        Assessment:   Bilateral knee arthritis flare    Plan:  We reviewed the role of imaging, physical therapy, injections and the time frame to healing and correlation with outcome.  1.  We discussed getting appreciated for gel injections.  Will do a 1 shot series when his knee pain returns   2.  Continue oral steroids.  Discussed potentially rheumatology referral  3.  Ice: 30 minutes on and off  4.  Exercise home program: Medically directed knee therapy / Handout given     Physical Exam  Well-nourished, well-developed. No acute distress. Alert and oriented x3. Responds appropriately to questioning. Good mood. Normal affect.  Physical Exam  Bilateral knee:  Skin healthy and intact  No gross swelling or ecchymosis  Trace Effusion:   ROM: Right 110, left 110  Crepitance with range of motion  No pain with internal rotation of the hip  Tenderness to palpation over medial and lateral joint line and with patellar compression    Mild laxity to valgus stress/varus stress  Negative Lachman´s test  Positive Augustine´s test/Apley Grind  Neurovascular exam normal distally  Palpable pedal pulse and good cap refill     Review of Systems   GENERAL: Negative for malaise, significant weight loss, fever  MUSCULOSKELETAL: See HPI  NEURO:  Negative     Past Medical History:   Diagnosis Date    Acute  embolism and thrombosis of unspecified deep veins of unspecified lower extremity (CMS/HCC) 11/11/2013    Acute deep vein thrombosis of lower extremity    Acute upper respiratory infection, unspecified 04/25/2018    Acute URI    Body mass index (BMI) 35.0-35.9, adult 09/17/2018    BMI 35.0-35.9,adult    Encounter for other preprocedural examination 06/25/2015    Preop testing    Encounter for other preprocedural examination 06/25/2015    Preop examination    Encounter for screening for cardiovascular disorders     Encounter for screening for cardiovascular disorders    Insect bite (nonvenomous) of lower back and pelvis, initial encounter 08/05/2014    Tick bite of back    Ocular pain, left eye 09/07/2018    Pain of left eye    Other conditions influencing health status 03/21/2017    Foot pain, unspecified laterality    Other polyuria 05/29/2013    Polyuria    Other shoulder lesions, left shoulder 04/11/2016    Tendinitis of left rotator cuff    Other shoulder lesions, unspecified shoulder 03/13/2013    Rotator cuff tendonitis    Other specified soft tissue disorders 03/21/2017    Leg swelling    Pain in left knee 08/23/2019    Left knee pain    Pain in unspecified knee 12/09/2014    Acute knee pain    Personal history of other diseases of the circulatory system 01/20/2020    History of hypertension    Personal history of other diseases of the circulatory system     History of hypertension    Personal history of other diseases of the nervous system and sense organs 05/06/2015    History of otitis media    Personal history of other diseases of the nervous system and sense organs     History of sleep apnea    Personal history of other endocrine, nutritional and metabolic disease 04/14/2015    History of obesity    Personal history of other specified conditions 02/19/2020    History of nasal congestion    Personal history of other specified conditions 06/26/2013    History of fatigue    Personal history of other  specified conditions     History of insomnia    Phlebitis and thrombophlebitis of superficial vessels of left lower extremity 2017    Thrombophlebitis of superficial veins of left lower extremity    Phlebitis and thrombophlebitis of superficial vessels of right lower extremity 2017    Thrombophlebitis of superficial veins of right lower extremity    Phlebitis and thrombophlebitis of unspecified site     Superficial thrombophlebitis       Medication Documentation Review Audit       Reviewed by Melissa Brunner, MA (Medical Assistant) on 24 at 1322      Medication Order Taking? Sig Documenting Provider Last Dose Status   aspirin 81 mg EC tablet [id number] No Take 1 tablet (81 mg) by mouth once daily. Historical Provider, MD Taking Active   calcium carbonate-vitamin D3 600 mg-5 mcg (200 unit) tablet [id number] No Take 1 tablet by mouth once daily. Aaliyah Provider, MD Taking Active   cholecalciferol (Vitamin D-3) 25 MCG (1000 UT) tablet [id number] No Take 1 tablet (25 mcg) by mouth once daily. Historical Provider, MD Taking Active   cyclobenzaprine (Flexeril) 10 mg tablet [id number]  Take 0.5 tablets (5 mg) by mouth 3 times a day for 10 days. Tashia Ruano, APRN-CNP   23 [id number]   cyclobenzaprine (Flexeril) 5 mg tablet [id number]  Take 1 tablet (5 mg) by mouth 3 times a day as needed for muscle spasms for up to 10 days. Blake Conti MD   24 2359   gabapentin (Neurontin) 300 mg capsule [id number] No TAKE 2 CAPSULES BY MOUTH THREE TIMES DAILY AS NEEDED Historical Provider, MD Taking Active   methocarbamol (Robaxin) 500 mg tablet [id number] No Take by mouth. Historical Provider, MD Taking Active   methylPREDNISolone (Medrol Dospak) 4 mg tablets [id number]  Follow schedule on package instructions Blake Conti MD  Active   oxyCODONE-acetaminophen (Percocet) 5-325 mg tablet [id number] No Take 1 tablet by mouth 3 times a day as needed. Historical Provider, MD Taking Active   predniSONE (Deltasone)  1 mg tablet [id number] No Take 2 tablets (2 mg) by mouth 2 times a day. Adry Presley MD Taking Active   rivaroxaban (Xarelto) 10 mg tablet [id number] No Take 1 tablet (10 mg) by mouth once daily. Jairo Cosby MD Taking Active   traZODone (Desyrel) 50 mg tablet [id number] No Take 2 tablets (100 mg) by mouth once daily at bedtime. Aaliyah Mejia MD Taking Active   traZODone (Desyrel) 50 mg tablet [id number]  Take 1 tablet (50 mg) by mouth once daily at bedtime. Adry Presley MD  Active   zolpidem (Ambien) 5 mg tablet [id number]  TAKE 1 TABLET BY MOUTH DAILY AS NEEDED AT BEDTIME Adry Presley MD  Active                    Allergies   Allergen Reactions    Shellfish Containing Products Unknown     All seafood    Sulfamethoxazole-Trimethoprim Hives and Rash     Bactrim       Social History     Socioeconomic History    Marital status:      Spouse name: Not on file    Number of children: Not on file    Years of education: Not on file    Highest education level: Not on file   Occupational History    Not on file   Tobacco Use    Smoking status: Never    Smokeless tobacco: Never   Vaping Use    Vaping Use: Never used   Substance and Sexual Activity    Alcohol use: Yes     Comment: social    Drug use: Never    Sexual activity: Not on file   Other Topics Concern    Not on file   Social History Narrative    Not on file     Social Determinants of Health     Financial Resource Strain: Not on file   Food Insecurity: Not on file   Transportation Needs: Not on file   Physical Activity: Not on file   Stress: Not on file   Social Connections: Not on file   Intimate Partner Violence: Not on file   Housing Stability: Not on file       Past Surgical History:   Procedure Laterality Date    BACK SURGERY  10/26/2015    Lower Back Surgery    KNEE ARTHROSCOPY W/ DEBRIDEMENT  04/29/2013    Arthroscopy Knee    OTHER SURGICAL HISTORY  04/29/2013    Total Disc Arthroplasty Lumbar    OTHER SURGICAL HISTORY  10/26/2015     Laminectomy Decompressive More Than Two Lumbar Segments    OTHER SURGICAL HISTORY  03/09/2022    Back surgery       No results found.

## 2024-04-01 ENCOUNTER — OFFICE VISIT (OUTPATIENT)
Dept: PRIMARY CARE | Facility: CLINIC | Age: 64
End: 2024-04-01
Payer: COMMERCIAL

## 2024-04-01 VITALS
HEIGHT: 70 IN | SYSTOLIC BLOOD PRESSURE: 118 MMHG | DIASTOLIC BLOOD PRESSURE: 80 MMHG | TEMPERATURE: 97.1 F | HEART RATE: 79 BPM | BODY MASS INDEX: 32.58 KG/M2 | WEIGHT: 227.6 LBS | OXYGEN SATURATION: 94 %

## 2024-04-01 DIAGNOSIS — M35.3 POLYMYALGIA RHEUMATICA (MULTI): ICD-10-CM

## 2024-04-01 DIAGNOSIS — I26.99 RECURRENT PULMONARY EMBOLISM (MULTI): ICD-10-CM

## 2024-04-01 DIAGNOSIS — Z00.00 ROUTINE GENERAL MEDICAL EXAMINATION AT A HEALTH CARE FACILITY: ICD-10-CM

## 2024-04-01 DIAGNOSIS — I10 PRIMARY HYPERTENSION: Primary | ICD-10-CM

## 2024-04-01 DIAGNOSIS — F51.01 PRIMARY INSOMNIA: ICD-10-CM

## 2024-04-01 PROBLEM — R10.9 FLANK PAIN: Status: RESOLVED | Noted: 2023-05-16 | Resolved: 2024-04-01

## 2024-04-01 PROBLEM — L03.90 CELLULITIS: Status: RESOLVED | Noted: 2022-06-23 | Resolved: 2024-04-01

## 2024-04-01 PROBLEM — K57.92 DIVERTICULITIS OF INTESTINE: Status: RESOLVED | Noted: 2023-05-22 | Resolved: 2024-04-01

## 2024-04-01 PROBLEM — K57.80 PERFORATED DIVERTICULUM: Status: RESOLVED | Noted: 2023-05-16 | Resolved: 2024-04-01

## 2024-04-01 PROCEDURE — 3079F DIAST BP 80-89 MM HG: CPT | Performed by: INTERNAL MEDICINE

## 2024-04-01 PROCEDURE — 3074F SYST BP LT 130 MM HG: CPT | Performed by: INTERNAL MEDICINE

## 2024-04-01 PROCEDURE — 1036F TOBACCO NON-USER: CPT | Performed by: INTERNAL MEDICINE

## 2024-04-01 PROCEDURE — 99214 OFFICE O/P EST MOD 30 MIN: CPT | Performed by: INTERNAL MEDICINE

## 2024-04-01 RX ORDER — PREDNISONE 1 MG/1
3 TABLET ORAL 2 TIMES DAILY
Qty: 540 TABLET | Refills: 3 | Status: SHIPPED | OUTPATIENT
Start: 2024-04-01 | End: 2025-04-01

## 2024-04-01 RX ORDER — ZOLPIDEM TARTRATE 5 MG/1
5 TABLET ORAL NIGHTLY PRN
Qty: 90 TABLET | Refills: 1 | Status: SHIPPED | OUTPATIENT
Start: 2024-04-01

## 2024-04-01 ASSESSMENT — ENCOUNTER SYMPTOMS
SHORTNESS OF BREATH: 0
COUGH: 0
APPETITE CHANGE: 0
ACTIVITY CHANGE: 0
PALPITATIONS: 0

## 2024-04-01 NOTE — PROGRESS NOTES
Subjective   Patient ID: Anton Whitt is a 63 y.o. male who presents for Follow-up.  64 yo here for follow up  His PMR is a little worse - a little more achy  He is very hesitant to increase his prednisone (on 2 mg qam)          Review of Systems   Constitutional:  Negative for activity change and appetite change.   Respiratory:  Negative for cough and shortness of breath.    Cardiovascular:  Negative for chest pain, palpitations and leg swelling.       Objective   Vitals:    04/01/24 0932   BP: 118/80   Pulse: 79   Temp: 36.2 °C (97.1 °F)   SpO2: 94%     Physical Exam  Vitals and nursing note reviewed.   Cardiovascular:      Rate and Rhythm: Normal rate and regular rhythm.      Heart sounds: Normal heart sounds.   Pulmonary:      Effort: Pulmonary effort is normal.      Breath sounds: Normal breath sounds.   Musculoskeletal:      Cervical back: Normal range of motion.   Neurological:      Mental Status: He is alert.         Assessment/Plan   Problem List Items Addressed This Visit       Insomnia    Relevant Medications    zolpidem (Ambien) 5 mg tablet    Polymyalgia rheumatica (CMS/HCC)    Current Assessment & Plan     Increase Prednisone 2mg am and 1 mg pm for 4 weeks and then back to 1 mg twice a day  Consider going back to Dr Aragon if no improvement         Relevant Medications    predniSONE (Deltasone) 1 mg tablet    Recurrent pulmonary embolism (CMS/HCC)    Current Assessment & Plan     On lifelong anticoagulation         Hypertension - Primary    Current Assessment & Plan     Stable off all meds          Other Visit Diagnoses       Routine general medical examination at a health care facility             Follow up with me in 6 months

## 2024-04-01 NOTE — ASSESSMENT & PLAN NOTE
Increase Prednisone 2mg am and 1 mg pm for 4 weeks and then back to 1 mg twice a day  Consider going back to Dr Aragon if no improvement

## 2024-04-18 DIAGNOSIS — G47.00 INSOMNIA, UNSPECIFIED TYPE: ICD-10-CM

## 2024-04-18 NOTE — TELEPHONE ENCOUNTER
Sk patient,   Patient takes trazadone and patient is requesting refill. Patients is also wanting the medication from one a day to two a day. Thank you.

## 2024-04-19 DIAGNOSIS — G47.00 INSOMNIA, UNSPECIFIED TYPE: ICD-10-CM

## 2024-04-19 RX ORDER — TRAZODONE HYDROCHLORIDE 50 MG/1
TABLET ORAL
Qty: 60 TABLET | Refills: 0 | Status: SHIPPED
Start: 2024-04-19 | End: 2024-04-19 | Stop reason: SDUPTHER

## 2024-04-19 RX ORDER — TRAZODONE HYDROCHLORIDE 50 MG/1
TABLET ORAL
Qty: 60 TABLET | Refills: 0 | Status: SHIPPED | OUTPATIENT
Start: 2024-04-19 | End: 2024-04-19 | Stop reason: SDUPTHER

## 2024-04-19 RX ORDER — TRAZODONE HYDROCHLORIDE 50 MG/1
TABLET ORAL
Qty: 60 TABLET | Refills: 0 | Status: SHIPPED | OUTPATIENT
Start: 2024-04-19 | End: 2024-04-22 | Stop reason: SDUPTHER

## 2024-04-22 NOTE — TELEPHONE ENCOUNTER
----- Message from Anton Whitt sent at 4/22/2024 11:27 AM EDT -----  Regarding: Update Mail Order Perscription  Contact: [phone]  Aristides Presley,  This is Roseann...Uzair wanted me to ask/ confirm, if you could have his prescription for Trazodone Hydrochloride 50Mg updated with the [facility]. The dosage instructions they have are for 1 pill nightly, & he takes 2 pills nightly. He said they used to have the proper dosage, but somewhere along the line it changed, so now he runs out before insurance will let him refill!  Please call him if you have questions for him - [phone]   I know you just called one into [facility] in [place] to tide him over -  Thanks :)

## 2024-06-06 DIAGNOSIS — G47.00 INSOMNIA, UNSPECIFIED TYPE: ICD-10-CM

## 2024-06-07 RX ORDER — TRAZODONE HYDROCHLORIDE 50 MG/1
TABLET ORAL
Qty: 60 TABLET | Refills: 5 | Status: SHIPPED | OUTPATIENT
Start: 2024-06-07

## 2024-06-19 NOTE — PROGRESS NOTES
Chief Complaint   Patient presents with    Left Knee - Follow-up     B/l knee oa    Right Knee - Follow-up     History of Present Illness:   Patient presents with bilateral knee pain for several years.  The patient localizes the pain diffusely.  Pain is moderate, achy, diffuse.  He states that overall the knees are now doing much better and he is interested in doing the gel injections.  He is on oral steroid for PMR. He states that the right knee is worse than the left.     Imaging:  Knee: Radiographs show moderate medial compartment arthritis grade 3 with significant joint space narrowing     Assessment:   Bilateral knee arthritis flare    Plan:  We reviewed the role of imaging, physical therapy, injections and the time frame to healing and correlation with outcome.  Pre cert bilateral single shot gel injections  Continue oral steroids.  Discussed potentially rheumatology referral  Ice: 60 minutes on and off  Exercise home program: Medically directed knee therapy / Handout given  Bilateral cortisone injections today. Follow-up as needed    L Inj/Asp: bilateral knee on 6/20/2024 11:13 AM  Indications: pain  Details: 22 G needle, anterolateral approach  Medications (Right): 8 mL lidocaine 10 mg/mL (1 %); 2.5 mg triamcinolone acetonide 40 mg/mL  Medications (Left): 8 mL lidocaine 10 mg/mL (1 %); 2.5 mg triamcinolone acetonide 40 mg/mL  Outcome: tolerated well, no immediate complications  Procedure, treatment alternatives, risks and benefits explained, specific risks discussed. Consent was given by the patient. Immediately prior to procedure a time out was called to verify the correct patient, procedure, equipment, support staff and site/side marked as required. Patient was prepped and draped in the usual sterile fashion.       Physical Exam:  Well-nourished, well-developed. No acute distress. Alert and oriented x3. Responds appropriately to questioning. Good mood. Normal affect.  Physical Exam  Bilateral knee:  Skin  healthy and intact  No gross swelling or ecchymosis  Trace Effusion:   ROM: Right 110, left 110  Crepitance with range of motion  No pain with internal rotation of the hip  Tenderness to palpation over medial and lateral joint line and with patellar compression    Mild laxity to valgus stress/varus stress  Negative Lachman´s test  Positive Augustine´s test/Apley Grind  Neurovascular exam normal distally  Palpable pedal pulse and good cap refill     Review of Systems:  GENERAL: Negative for malaise, significant weight loss, fever  MUSCULOSKELETAL: See HPI  NEURO:  Negative     Past Medical History:   Diagnosis Date    Acute embolism and thrombosis of unspecified deep veins of unspecified lower extremity (Multi) 11/11/2013    Acute deep vein thrombosis of lower extremity    Acute upper respiratory infection, unspecified 04/25/2018    Acute URI    Body mass index (BMI) 35.0-35.9, adult 09/17/2018    BMI 35.0-35.9,adult    Encounter for other preprocedural examination 06/25/2015    Preop testing    Encounter for other preprocedural examination 06/25/2015    Preop examination    Encounter for screening for cardiovascular disorders     Encounter for screening for cardiovascular disorders    Insect bite (nonvenomous) of lower back and pelvis, initial encounter 08/05/2014    Tick bite of back    Ocular pain, left eye 09/07/2018    Pain of left eye    Other conditions influencing health status 03/21/2017    Foot pain, unspecified laterality    Other polyuria 05/29/2013    Polyuria    Other shoulder lesions, left shoulder 04/11/2016    Tendinitis of left rotator cuff    Other shoulder lesions, unspecified shoulder 03/13/2013    Rotator cuff tendonitis    Other specified soft tissue disorders 03/21/2017    Leg swelling    Pain in left knee 08/23/2019    Left knee pain    Pain in unspecified knee 12/09/2014    Acute knee pain    Personal history of other diseases of the circulatory system 01/20/2020    History of hypertension     Personal history of other diseases of the circulatory system     History of hypertension    Personal history of other diseases of the nervous system and sense organs 2015    History of otitis media    Personal history of other diseases of the nervous system and sense organs     History of sleep apnea    Personal history of other endocrine, nutritional and metabolic disease 2015    History of obesity    Personal history of other specified conditions 2020    History of nasal congestion    Personal history of other specified conditions 2013    History of fatigue    Personal history of other specified conditions     History of insomnia    Phlebitis and thrombophlebitis of superficial vessels of left lower extremity 2017    Thrombophlebitis of superficial veins of left lower extremity    Phlebitis and thrombophlebitis of superficial vessels of right lower extremity 2017    Thrombophlebitis of superficial veins of right lower extremity    Phlebitis and thrombophlebitis of unspecified site     Superficial thrombophlebitis       Medication Documentation Review Audit       Reviewed by Melissa Brunner, MA (Medical Assistant) on 24 at 0924      Medication Order Taking? Sig Documenting Provider Last Dose Status   aspirin 81 mg EC tablet [id number] No Take 1 tablet (81 mg) by mouth once daily. Historical Provider, MD Taking Active   calcium carbonate-vitamin D3 600 mg-5 mcg (200 unit) tablet [id number] No Take 1 tablet by mouth once daily. Historical Provider, MD Taking Active   cyclobenzaprine (Flexeril) 10 mg tablet [id number]  Take 0.5 tablets (5 mg) by mouth 3 times a day for 10 days. Tashia Dreams, APRN-CNP   23 [id number]   methocarbamol (Robaxin) 500 mg tablet [id number] No Take by mouth. Historical Provider, MD Taking Active   predniSONE (Deltasone) 1 mg tablet [id number]  Take 3 tablets (3 mg) by mouth 2 times a day. Adry Presley MD  Active   rivaroxaban (Xarelto) 10 mg tablet  [id number] No Take 1 tablet (10 mg) by mouth once daily. Jairo Cosby MD Taking Active   traZODone (Desyrel) 50 mg tablet [id number]  TAKE 1 TO 2 TABLETS BY MOUTH AT BEDTIME AS NEEDED FOR SLEEPLESSNESS Adry Presley MD  Active   zolpidem (Ambien) 5 mg tablet [id number]  Take 1 tablet (5 mg) by mouth as needed at bedtime for sleep. Adry Presley MD  Active                    Allergies   Allergen Reactions    Shellfish Containing Products Unknown     All seafood    Sulfamethoxazole-Trimethoprim Hives and Rash     Bactrim       Social History     Socioeconomic History    Marital status:      Spouse name: Not on file    Number of children: Not on file    Years of education: Not on file    Highest education level: Not on file   Occupational History    Not on file   Tobacco Use    Smoking status: Never    Smokeless tobacco: Never   Vaping Use    Vaping status: Never Used   Substance and Sexual Activity    Alcohol use: Yes     Comment: social    Drug use: Never    Sexual activity: Not on file   Other Topics Concern    Not on file   Social History Narrative    Not on file     Social Determinants of Health     Financial Resource Strain: Not on file   Food Insecurity: Not on file   Transportation Needs: Not on file   Physical Activity: Not on file   Stress: Not on file   Social Connections: Not on file   Intimate Partner Violence: Not on file   Housing Stability: Not on file       Past Surgical History:   Procedure Laterality Date    BACK SURGERY  10/26/2015    Lower Back Surgery    KNEE ARTHROSCOPY W/ DEBRIDEMENT  04/29/2013    Arthroscopy Knee    OTHER SURGICAL HISTORY  04/29/2013    Total Disc Arthroplasty Lumbar    OTHER SURGICAL HISTORY  10/26/2015    Laminectomy Decompressive More Than Two Lumbar Segments    OTHER SURGICAL HISTORY  03/09/2022    Back surgery       No results found.        Scribe Attestation  By signing my name below, Brittany BRINK Scribe   attest that this documentation has been prepared  under the direction and in the presence of Blake Conti MD.

## 2024-06-20 ENCOUNTER — OFFICE VISIT (OUTPATIENT)
Dept: ORTHOPEDIC SURGERY | Facility: CLINIC | Age: 64
End: 2024-06-20
Payer: COMMERCIAL

## 2024-06-20 DIAGNOSIS — M17.0 PRIMARY OSTEOARTHRITIS OF BOTH KNEES: Primary | ICD-10-CM

## 2024-06-20 PROCEDURE — 20610 DRAIN/INJ JOINT/BURSA W/O US: CPT | Performed by: ORTHOPAEDIC SURGERY

## 2024-06-20 PROCEDURE — 99213 OFFICE O/P EST LOW 20 MIN: CPT | Performed by: ORTHOPAEDIC SURGERY

## 2024-06-20 PROCEDURE — 1036F TOBACCO NON-USER: CPT | Performed by: ORTHOPAEDIC SURGERY

## 2024-06-20 RX ORDER — TRIAMCINOLONE ACETONIDE 40 MG/ML
2.5 INJECTION, SUSPENSION INTRA-ARTICULAR; INTRAMUSCULAR
Status: COMPLETED | OUTPATIENT
Start: 2024-06-20 | End: 2024-06-20

## 2024-06-20 RX ORDER — LIDOCAINE HYDROCHLORIDE 10 MG/ML
8 INJECTION INFILTRATION; PERINEURAL
Status: COMPLETED | OUTPATIENT
Start: 2024-06-20 | End: 2024-06-20

## 2024-07-26 DIAGNOSIS — G47.00 INSOMNIA, UNSPECIFIED TYPE: ICD-10-CM

## 2024-07-26 RX ORDER — TRAZODONE HYDROCHLORIDE 50 MG/1
TABLET ORAL
Qty: 180 TABLET | Refills: 3 | Status: SHIPPED | OUTPATIENT
Start: 2024-07-26

## 2024-08-05 DIAGNOSIS — G47.00 INSOMNIA, UNSPECIFIED TYPE: ICD-10-CM

## 2024-08-05 RX ORDER — TRAZODONE HYDROCHLORIDE 50 MG/1
TABLET ORAL
Qty: 14 TABLET | Refills: 0 | Status: SHIPPED | OUTPATIENT
Start: 2024-08-05

## 2024-08-12 ENCOUNTER — LAB (OUTPATIENT)
Dept: LAB | Facility: LAB | Age: 64
End: 2024-08-12
Payer: COMMERCIAL

## 2024-08-12 ENCOUNTER — OFFICE VISIT (OUTPATIENT)
Dept: PRIMARY CARE | Facility: CLINIC | Age: 64
End: 2024-08-12
Payer: COMMERCIAL

## 2024-08-12 VITALS
WEIGHT: 231.4 LBS | OXYGEN SATURATION: 96 % | BODY MASS INDEX: 33.13 KG/M2 | HEART RATE: 96 BPM | DIASTOLIC BLOOD PRESSURE: 82 MMHG | HEIGHT: 70 IN | SYSTOLIC BLOOD PRESSURE: 118 MMHG

## 2024-08-12 DIAGNOSIS — M35.3 POLYMYALGIA RHEUMATICA (MULTI): ICD-10-CM

## 2024-08-12 DIAGNOSIS — Z00.00 ROUTINE GENERAL MEDICAL EXAMINATION AT A HEALTH CARE FACILITY: ICD-10-CM

## 2024-08-12 DIAGNOSIS — I27.82 OTHER CHRONIC PULMONARY EMBOLISM WITHOUT ACUTE COR PULMONALE (MULTI): Primary | ICD-10-CM

## 2024-08-12 PROBLEM — R10.9 ABDOMINAL PAIN: Status: RESOLVED | Noted: 2023-05-22 | Resolved: 2024-08-12

## 2024-08-12 PROCEDURE — 86431 RHEUMATOID FACTOR QUANT: CPT

## 2024-08-12 PROCEDURE — 84443 ASSAY THYROID STIM HORMONE: CPT

## 2024-08-12 PROCEDURE — 99213 OFFICE O/P EST LOW 20 MIN: CPT | Performed by: INTERNAL MEDICINE

## 2024-08-12 PROCEDURE — 1036F TOBACCO NON-USER: CPT | Performed by: INTERNAL MEDICINE

## 2024-08-12 PROCEDURE — 3079F DIAST BP 80-89 MM HG: CPT | Performed by: INTERNAL MEDICINE

## 2024-08-12 PROCEDURE — 3008F BODY MASS INDEX DOCD: CPT | Performed by: INTERNAL MEDICINE

## 2024-08-12 PROCEDURE — 36415 COLL VENOUS BLD VENIPUNCTURE: CPT

## 2024-08-12 PROCEDURE — 80053 COMPREHEN METABOLIC PANEL: CPT

## 2024-08-12 PROCEDURE — 85652 RBC SED RATE AUTOMATED: CPT

## 2024-08-12 PROCEDURE — 86140 C-REACTIVE PROTEIN: CPT

## 2024-08-12 PROCEDURE — 3074F SYST BP LT 130 MM HG: CPT | Performed by: INTERNAL MEDICINE

## 2024-08-12 PROCEDURE — 86200 CCP ANTIBODY: CPT

## 2024-08-12 PROCEDURE — 85025 COMPLETE CBC W/AUTO DIFF WBC: CPT

## 2024-08-12 PROCEDURE — 80061 LIPID PANEL: CPT

## 2024-08-12 RX ORDER — PREDNISONE 10 MG/1
10 TABLET ORAL 2 TIMES DAILY
Qty: 60 TABLET | Refills: 0 | Status: SHIPPED
Start: 2024-08-12 | End: 2024-08-12 | Stop reason: SDUPTHER

## 2024-08-12 RX ORDER — PREDNISONE 10 MG/1
10 TABLET ORAL 2 TIMES DAILY
Qty: 60 TABLET | Refills: 0 | Status: SHIPPED | OUTPATIENT
Start: 2024-08-12 | End: 2024-09-11

## 2024-08-12 ASSESSMENT — ENCOUNTER SYMPTOMS
SHORTNESS OF BREATH: 0
PALPITATIONS: 0
ACTIVITY CHANGE: 0
APPETITE CHANGE: 0
COUGH: 0

## 2024-08-12 NOTE — ASSESSMENT & PLAN NOTE
Will order labs and increase Prednisone 10mg bid for 30 days and taper  Having side effects from Prednisone - facial acne and delayed healing of skin tears etc  Would recommend he see a rheumatologist - not sure if he  is a candidate of newer agents given his history of recurrent diverticulitis    Orders:    CBC and Auto Differential; Future    Comprehensive metabolic panel; Future    Sedimentation Rate; Future    C-reactive protein; Future    Rheumatoid Factor; Future    Citrulline Antibody, IgG; Future    Referral to Rheumatology; Future    predniSONE (Deltasone) 10 mg tablet; Take 1 tablet (10 mg) by mouth 2 times a day.  Follow up with me in 1 month

## 2024-08-12 NOTE — PROGRESS NOTES
Subjective   Patient ID: Anton Whitt is a 64 y.o. male who presents for bilateral shouler pain (Onset a few weeks ago and has worsened within the last 2 weeks. ), Numbness (Bilateral hands, onset within the last week), and Hip Pain (Primarily left sided, onset a few weeks ago.).    65 yo here with c/o aching all over  Progressively worsening for 2 weeks  Morning stiffness for 2 hours  Right hip and bilateral shoulders are the worse  Hands tingle  Taking prednisone 1 mg bid - dropped this dose down a few months ago  Feels like a flare of his PMR      Review of Systems   Constitutional:  Negative for activity change and appetite change.   Respiratory:  Negative for cough and shortness of breath.    Cardiovascular:  Negative for chest pain, palpitations and leg swelling.       Objective   Vitals:    08/12/24 1422   BP: 118/82   Pulse: 96   SpO2: 96%     Physical Exam  Vitals and nursing note reviewed.   Cardiovascular:      Rate and Rhythm: Normal rate and regular rhythm.      Heart sounds: Normal heart sounds.   Pulmonary:      Effort: Pulmonary effort is normal.      Breath sounds: Normal breath sounds.   Musculoskeletal:      Cervical back: Normal range of motion.   Neurological:      Mental Status: He is alert.           Assessment & Plan  Other chronic pulmonary embolism without acute cor pulmonale (Multi)  On Xarelto       Polymyalgia rheumatica (Multi)  Will order labs and increase Prednisone 10mg bid for 30 days and taper  Having side effects from Prednisone - facial acne and delayed healing of skin tears etc  Would recommend he see a rheumatologist - not sure if he  is a candidate of newer agents given his history of recurrent diverticulitis    Orders:    CBC and Auto Differential; Future    Comprehensive metabolic panel; Future    Sedimentation Rate; Future    C-reactive protein; Future    Rheumatoid Factor; Future    Citrulline Antibody, IgG; Future    Referral to Rheumatology; Future    predniSONE  (Deltasone) 10 mg tablet; Take 1 tablet (10 mg) by mouth 2 times a day.  Follow up with me in 1 month

## 2024-08-12 NOTE — ASSESSMENT & PLAN NOTE
>>ASSESSMENT AND PLAN FOR PULMONARY EMBOLISM (MULTI) WRITTEN ON 8/12/2024  2:56 PM BY EUNICE SELF MD    On Xarelto

## 2024-08-13 LAB
ALBUMIN SERPL BCP-MCNC: 4.3 G/DL (ref 3.4–5)
ALP SERPL-CCNC: 73 U/L (ref 33–136)
ALT SERPL W P-5'-P-CCNC: 13 U/L (ref 10–52)
ANION GAP SERPL CALC-SCNC: 16 MMOL/L (ref 10–20)
AST SERPL W P-5'-P-CCNC: 19 U/L (ref 9–39)
BASOPHILS # BLD AUTO: 0.06 X10*3/UL (ref 0–0.1)
BASOPHILS NFR BLD AUTO: 0.5 %
BILIRUB SERPL-MCNC: 0.9 MG/DL (ref 0–1.2)
BUN SERPL-MCNC: 18 MG/DL (ref 6–23)
CALCIUM SERPL-MCNC: 9.2 MG/DL (ref 8.6–10.6)
CCP IGG SERPL-ACNC: <1 U/ML
CHLORIDE SERPL-SCNC: 101 MMOL/L (ref 98–107)
CHOLEST SERPL-MCNC: 182 MG/DL (ref 0–199)
CHOLESTEROL/HDL RATIO: 3.5
CO2 SERPL-SCNC: 26 MMOL/L (ref 21–32)
CREAT SERPL-MCNC: 1.04 MG/DL (ref 0.5–1.3)
CRP SERPL-MCNC: 3.51 MG/DL
EGFRCR SERPLBLD CKD-EPI 2021: 81 ML/MIN/1.73M*2
EOSINOPHIL # BLD AUTO: 0.2 X10*3/UL (ref 0–0.7)
EOSINOPHIL NFR BLD AUTO: 1.8 %
ERYTHROCYTE [DISTWIDTH] IN BLOOD BY AUTOMATED COUNT: 13 % (ref 11.5–14.5)
ERYTHROCYTE [SEDIMENTATION RATE] IN BLOOD BY WESTERGREN METHOD: 25 MM/H (ref 0–20)
GLUCOSE SERPL-MCNC: 87 MG/DL (ref 74–99)
HCT VFR BLD AUTO: 43.5 % (ref 41–52)
HDLC SERPL-MCNC: 52.7 MG/DL
HGB BLD-MCNC: 14 G/DL (ref 13.5–17.5)
IMM GRANULOCYTES # BLD AUTO: 0.04 X10*3/UL (ref 0–0.7)
IMM GRANULOCYTES NFR BLD AUTO: 0.4 % (ref 0–0.9)
LDLC SERPL CALC-MCNC: 110 MG/DL
LYMPHOCYTES # BLD AUTO: 1.61 X10*3/UL (ref 1.2–4.8)
LYMPHOCYTES NFR BLD AUTO: 14.7 %
MCH RBC QN AUTO: 29.8 PG (ref 26–34)
MCHC RBC AUTO-ENTMCNC: 32.2 G/DL (ref 32–36)
MCV RBC AUTO: 93 FL (ref 80–100)
MONOCYTES # BLD AUTO: 1.08 X10*3/UL (ref 0.1–1)
MONOCYTES NFR BLD AUTO: 9.9 %
NEUTROPHILS # BLD AUTO: 7.95 X10*3/UL (ref 1.2–7.7)
NEUTROPHILS NFR BLD AUTO: 72.7 %
NON HDL CHOLESTEROL: 129 MG/DL (ref 0–149)
NRBC BLD-RTO: 0 /100 WBCS (ref 0–0)
PLATELET # BLD AUTO: 242 X10*3/UL (ref 150–450)
POTASSIUM SERPL-SCNC: 4.2 MMOL/L (ref 3.5–5.3)
PROT SERPL-MCNC: 6.7 G/DL (ref 6.4–8.2)
RBC # BLD AUTO: 4.7 X10*6/UL (ref 4.5–5.9)
RHEUMATOID FACT SER NEPH-ACNC: <10 IU/ML (ref 0–15)
SODIUM SERPL-SCNC: 139 MMOL/L (ref 136–145)
TRIGL SERPL-MCNC: 98 MG/DL (ref 0–149)
TSH SERPL-ACNC: 1.27 MIU/L (ref 0.44–3.98)
VLDL: 20 MG/DL (ref 0–40)
WBC # BLD AUTO: 10.9 X10*3/UL (ref 4.4–11.3)

## 2024-08-20 ENCOUNTER — OFFICE VISIT (OUTPATIENT)
Dept: RHEUMATOLOGY | Facility: CLINIC | Age: 64
End: 2024-08-20
Payer: COMMERCIAL

## 2024-08-20 VITALS
OXYGEN SATURATION: 96 % | WEIGHT: 230.4 LBS | TEMPERATURE: 98 F | BODY MASS INDEX: 32.99 KG/M2 | HEIGHT: 70 IN | DIASTOLIC BLOOD PRESSURE: 79 MMHG | HEART RATE: 91 BPM | RESPIRATION RATE: 16 BRPM | SYSTOLIC BLOOD PRESSURE: 125 MMHG

## 2024-08-20 DIAGNOSIS — M35.3 POLYMYALGIA RHEUMATICA (MULTI): Primary | ICD-10-CM

## 2024-08-20 PROCEDURE — 99214 OFFICE O/P EST MOD 30 MIN: CPT | Performed by: INTERNAL MEDICINE

## 2024-08-20 PROCEDURE — 1036F TOBACCO NON-USER: CPT | Performed by: INTERNAL MEDICINE

## 2024-08-20 PROCEDURE — 3074F SYST BP LT 130 MM HG: CPT | Performed by: INTERNAL MEDICINE

## 2024-08-20 PROCEDURE — 3008F BODY MASS INDEX DOCD: CPT | Performed by: INTERNAL MEDICINE

## 2024-08-20 PROCEDURE — 3078F DIAST BP <80 MM HG: CPT | Performed by: INTERNAL MEDICINE

## 2024-08-20 NOTE — PATIENT INSTRUCTIONS
I recommend gradual taper of prednisone as tolerated-  I recommend tapering monthly until prednisone 10 mg daily is reached. (15 mg, 12.5 mg, then 10 mg daily).  Once prednisone 10 mg daily is reached, I recommend tapering every 3 months (7.5 mg, 5 mg, then 2.5 mg).  Alternatively, tapering can be done in 1 mg increments once he is below 5 mg daily.  If prednisone dose cannot be weaned less than 10 mg daily, low-dose methotrexate can be considered as a glucocorticoid sparing drug.  He may need to remain on a chronic low-dose of prednisone, such as 2 mg daily to try to control his PMR symptoms.  I did order a bone density, since his last was done over 2 years ago.

## 2024-08-20 NOTE — PROGRESS NOTES
Subjective   Patient ID: Anton Whitt is a 64 y.o. male who presents for No chief complaint on file..    HPI 64-year-old male here for follow-up regarding PMR (had normal ESR).  He believes his PMR symptoms started about 12 to 15 years ago.  He was last seen by me March 2021.    He weaned off prednisone early 2021.  However, he need to resume prednisone due to recurrent PMR symptoms.  He was able to wean his dose down to 1 mg twice daily.    He experienced a significant flare of his PMR symptoms about 2 weeks ago.  Pain and stiffness involved both shoulders and both hands.  He also noted that both of his hands are going numb, especially at nighttime when he was trying to sleep.  Inflammatory markers checked August 12, 2024 remarkable for ESR 25 and CRP 3.51 (normal less than 1).    He also has OA of both knees.  Left knee was injected with Kenalog 2/24 and  6/24 by Dr. Conti.    His prednisone was increased back to 10 mg twice daily, which has alleviated the majority of his symptoms, except for slight left hip discomfort.    He denies headaches, jaw claudication or blurred vision.    He is interested to know if there are any other options for treating his PMR.  He notes that his skin has become fragile and he bruises very easily.  He has also had several episodes of cellulitis.    He was hospitalized for 3 days May 2023 for complicated diverticulitis.  CT scan at that time showed perforated diverticulitis involving the proximal sigmoid colon . He was treated nonoperatively with bowel rest, IV antibiotics and IV fluids.  He had a colonoscopy June 2023 which showed diffuse diverticulosis without signs of diverticulitis.    He had another 24-hour hospital stay August 2023 for noncomplicated diverticulitis.  He was treated with IV antibiotics and bowel rest for 24 hours, then was discharged on Augmentin.  He was previously seen by Dr. Vaca in surgery regarding this.  He was advised that if he keeps getting  "episodes of diverticulitis he may need surgical intervention.    Bilateral knee Xrays 2/24: Moderate OA medial compartment.    Labs 8/24: ESR 25, CRP 3.51 (normal less than 1), RF negative, CCP negative, CBC with diff normal, CMP normal, TSH 1.27, chol 182, HDL 52, , TG 98    Medical problem list:  Recurrent pulmonary embolus  Bilateral knee OA  PMR (normal ESR)        Review of Systems  General: Denies fevers or chills.  CV: Denies chest pain or palpitations.  Denies leg edema.  Lungs: Denies coughing or shortness of breath.  Skin: Denies rashes or nodules.  MS: Complains of recurrent PMR symptoms which started about 2 weeks ago-see history of present illness for details.  Most of symptoms have resolved with prednisone 10 mg twice daily-he has minimal left hip discomfort.    Objective   There were no vitals taken for this visit.  Visit Vitals  /79 (BP Location: Left arm, Patient Position: Sitting, BP Cuff Size: Adult)   Pulse 91   Temp 36.7 °C (98 °F) (Skin)   Resp 16   Ht 1.778 m (5' 10\")   Wt 105 kg (230 lb 6.4 oz)   SpO2 96%   BMI 33.06 kg/m²   Smoking Status Never   BSA 2.28 m²      Physical Exam  General appearance: Well-nourished well-appearing.  HEENT: PERRL, EOMI. temporal arteries nontender.  Neck: Supple, no nodes.  CV: RRR, no MGR.  Lungs: Clear, no rales or wheezes.  Abdomen: Soft, nontender. No hepatosplenomegaly.  Extremities:  No cyanosis, clubbing, or edema.  MS: No synovitis.  Skin: No rashes or nodules.      Assessment/Plan   Problem List Items Addressed This Visit             ICD-10-CM    Polymyalgia rheumatica (Multi) - Primary M35.3    BMI 33.0-33.9,adult Z68.33       PMR-symptoms first began about 12 to 15 years ago.  He was only able to stop prednisone for short period of time about 3 years ago, but then needed to resume because of recurrent PMR symptoms.  He had gradually tapered down to prednisone 1 mg twice daily, but then had a flare 2 weeks ago.  Inflammatory markers were " significantly elevated August 2024, with a ESR 25 and CRP 3.51.    Most of his symptoms have resolved with increasing prednisone to 10 mg twice daily.    I discussed possible steroid sparing medications that can be used for PMR.  I believe Ivette is contraindicated, because he had 2 prior episodes of diverticulitis requiring hospitalization in 2023.  The first episode May 2023 was complicated by small perforation which did not require surgery.  I explained that the risk of this medication would outweigh the benefit, due to the risk of bowel perforation from the medication itself.    I explained that methotrexate is sometimes helpful, but it may help slightly (rather than being a profound effect).  It is typically only used of prednisone dose cannot be reduced to 10 mg daily or less.  I warned of risk of pneumonitis, stomatitis, elevated liver enzymes, and bone marrow suppression.    I did review potential side effects of prednisone, including increased risk of infection, weight gain, cataracts, and osteoporosis.    BMI 33- stable.    Plan:  I recommend gradual taper of prednisone as tolerated-  I recommend tapering monthly until prednisone 10 mg daily is reached. (15 mg, 12.5 mg, then 10 mg daily).  Once prednisone 10 mg daily is reached, I recommend tapering every 3 months (7.5 mg, 5 mg, then 2.5 mg).  Alternatively, tapering can be done in 1 mg increments once he is below 5 mg daily.  If prednisone dose cannot be weaned less than 10 mg daily, low-dose methotrexate can be considered as a glucocorticoid sparing drug.  He may need to remain on a chronic low-dose of prednisone, such as 2 mg daily to try to control his PMR symptoms.  I did order a bone density, since his last was done over 2 years ago.

## 2024-08-27 DIAGNOSIS — G47.00 INSOMNIA, UNSPECIFIED TYPE: ICD-10-CM

## 2024-08-27 RX ORDER — TRAZODONE HYDROCHLORIDE 50 MG/1
TABLET ORAL
Qty: 14 TABLET | Refills: 3 | Status: SHIPPED | OUTPATIENT
Start: 2024-08-27

## 2024-08-28 ENCOUNTER — APPOINTMENT (OUTPATIENT)
Dept: PRIMARY CARE | Facility: CLINIC | Age: 64
End: 2024-08-28
Payer: COMMERCIAL

## 2024-08-28 VITALS
TEMPERATURE: 98.2 F | RESPIRATION RATE: 16 BRPM | BODY MASS INDEX: 32.73 KG/M2 | HEART RATE: 75 BPM | OXYGEN SATURATION: 94 % | SYSTOLIC BLOOD PRESSURE: 128 MMHG | WEIGHT: 228.6 LBS | DIASTOLIC BLOOD PRESSURE: 86 MMHG | HEIGHT: 70 IN

## 2024-08-28 DIAGNOSIS — M35.3 POLYMYALGIA RHEUMATICA (MULTI): ICD-10-CM

## 2024-08-28 DIAGNOSIS — E78.5 HYPERLIPIDEMIA, UNSPECIFIED HYPERLIPIDEMIA TYPE: ICD-10-CM

## 2024-08-28 DIAGNOSIS — Z00.00 ROUTINE MEDICAL EXAM: ICD-10-CM

## 2024-08-28 DIAGNOSIS — I26.99 RECURRENT PULMONARY EMBOLISM (MULTI): Primary | ICD-10-CM

## 2024-08-28 PROBLEM — Z86.718 HISTORY OF DEEP VEIN THROMBOSIS: Status: RESOLVED | Noted: 2023-05-22 | Resolved: 2024-08-28

## 2024-08-28 PROBLEM — K57.32 SIGMOID DIVERTICULITIS: Status: RESOLVED | Noted: 2023-05-16 | Resolved: 2024-08-28

## 2024-08-28 PROBLEM — Z86.718 HISTORY OF THROMBOEMBOLISM OF VEIN: Status: RESOLVED | Noted: 2022-08-01 | Resolved: 2024-08-28

## 2024-08-28 PROBLEM — H66.90 OTITIS MEDIA: Status: RESOLVED | Noted: 2024-03-28 | Resolved: 2024-08-28

## 2024-08-28 PROBLEM — Z86.711 HISTORY OF PULMONARY EMBOLISM: Status: RESOLVED | Noted: 2023-05-22 | Resolved: 2024-08-28

## 2024-08-28 PROCEDURE — 93000 ELECTROCARDIOGRAM COMPLETE: CPT | Performed by: INTERNAL MEDICINE

## 2024-08-28 PROCEDURE — 3074F SYST BP LT 130 MM HG: CPT | Performed by: INTERNAL MEDICINE

## 2024-08-28 PROCEDURE — 99396 PREV VISIT EST AGE 40-64: CPT | Performed by: INTERNAL MEDICINE

## 2024-08-28 PROCEDURE — 1036F TOBACCO NON-USER: CPT | Performed by: INTERNAL MEDICINE

## 2024-08-28 PROCEDURE — 3008F BODY MASS INDEX DOCD: CPT | Performed by: INTERNAL MEDICINE

## 2024-08-28 PROCEDURE — 3079F DIAST BP 80-89 MM HG: CPT | Performed by: INTERNAL MEDICINE

## 2024-08-28 PROCEDURE — 90656 IIV3 VACC NO PRSV 0.5 ML IM: CPT | Performed by: INTERNAL MEDICINE

## 2024-08-28 PROCEDURE — 90471 IMMUNIZATION ADMIN: CPT | Performed by: INTERNAL MEDICINE

## 2024-08-28 RX ORDER — PREDNISONE 5 MG/1
7.5 TABLET ORAL 2 TIMES DAILY
Qty: 270 TABLET | Refills: 0 | Status: SHIPPED | OUTPATIENT
Start: 2024-08-28 | End: 2024-11-26

## 2024-08-28 RX ORDER — MUPIROCIN CALCIUM 20 MG/G
CREAM TOPICAL 3 TIMES DAILY
Qty: 30 G | Refills: 0 | Status: SHIPPED | OUTPATIENT
Start: 2024-08-28 | End: 2024-09-07

## 2024-08-28 ASSESSMENT — ENCOUNTER SYMPTOMS
ACTIVITY CHANGE: 0
APPETITE CHANGE: 0
DIARRHEA: 0
SLEEP DISTURBANCE: 0
ARTHRALGIAS: 0
FEVER: 0
CHEST TIGHTNESS: 0
FREQUENCY: 0
TREMORS: 0
SHORTNESS OF BREATH: 0
BACK PAIN: 0
PHOTOPHOBIA: 0
NAUSEA: 0
BLOOD IN STOOL: 0
NECK PAIN: 0
CONSTIPATION: 0
FATIGUE: 0
COUGH: 0
SORE THROAT: 0
TROUBLE SWALLOWING: 0
DYSURIA: 0
PALPITATIONS: 0
CHILLS: 0
DIFFICULTY URINATING: 0
DIZZINESS: 0
ABDOMINAL PAIN: 0

## 2024-08-28 NOTE — PROGRESS NOTES
Subjective   Patient ID: Anton Whitt is a 64 y.o. male who presents for Annual Exam.    64 y.o. here for a AMWV/physical  Has been feeling well  No active complaints today  His muscle pain is significantly better  HPI    Employment - [employer]  Children - 3 (daughter getting  next month); youngest daughter lives in [place]  Son in [place]  Tob - Nonsmoker  Alcohol - 3-5 per week  Marijuana  - denies  Exercise -  none now - will get started again    Review of Systems   Constitutional:  Negative for activity change, appetite change, chills, fatigue and fever.   HENT:  Negative for congestion, ear pain, sore throat, tinnitus and trouble swallowing.    Eyes:  Negative for photophobia and visual disturbance.   Respiratory:  Negative for cough, chest tightness and shortness of breath.    Cardiovascular:  Negative for chest pain, palpitations and leg swelling.   Gastrointestinal:  Negative for abdominal pain, blood in stool, constipation, diarrhea and nausea.   Genitourinary:  Negative for difficulty urinating, dysuria, frequency, genital sores, testicular pain and urgency.   Musculoskeletal:  Negative for arthralgias, back pain, gait problem and neck pain.   Skin:  Negative for rash.   Neurological:  Negative for dizziness and tremors.   Psychiatric/Behavioral:  Negative for sleep disturbance.    All other systems reviewed and are negative.      Objective   Vitals:    08/28/24 0807   BP: 128/86   Pulse: 75   Resp: 16   Temp: 36.8 °C (98.2 °F)   SpO2: 94%     Physical Exam  Constitutional:       General: He is not in acute distress.     Appearance: He is well-developed. He is not diaphoretic.   HENT:      Head: Normocephalic.      Right Ear: Tympanic membrane normal. There is no impacted cerumen.      Left Ear: Tympanic membrane normal. There is no impacted cerumen.      Nose: Nose normal.      Mouth/Throat:      Mouth: Mucous membranes are moist.      Pharynx: Oropharynx is clear. No  oropharyngeal exudate or posterior oropharyngeal erythema.   Eyes:      General: No scleral icterus.     Extraocular Movements: Extraocular movements intact.      Conjunctiva/sclera: Conjunctivae normal.      Pupils: Pupils are equal, round, and reactive to light.   Neck:      Thyroid: No thyromegaly.      Vascular: No JVD.   Cardiovascular:      Rate and Rhythm: Normal rate and regular rhythm.      Pulses: Normal pulses.      Heart sounds: Normal heart sounds. No murmur heard.     No friction rub. No gallop.   Pulmonary:      Effort: Pulmonary effort is normal. No respiratory distress.      Breath sounds: Normal breath sounds. No wheezing or rales.   Chest:      Chest wall: No tenderness.   Abdominal:      General: Bowel sounds are normal. There is no distension.      Palpations: Abdomen is soft. There is no mass.      Tenderness: There is no abdominal tenderness. There is no rebound.   Musculoskeletal:         General: Normal range of motion.      Cervical back: Normal range of motion and neck supple.   Lymphadenopathy:      Cervical: No cervical adenopathy.   Skin:     General: Skin is warm and dry.   Neurological:      General: No focal deficit present.      Mental Status: He is alert and oriented to person, place, and time.      Deep Tendon Reflexes: Reflexes normal.   Psychiatric:         Mood and Affect: Mood normal.         Thought Content: Thought content normal.         Assessment/Plan   Problem List Items Addressed This Visit       Hyperlipidemia    Relevant Orders    ECG 12 lead (Clinic Performed) (Completed)    Polymyalgia rheumatica (Multi)    Current Assessment & Plan     Per Dr Aragon - not a good candidate for newer agents  Continue Prednisone taper per Dr Aragon         Relevant Medications    predniSONE (Deltasone) 5 mg tablet    Other Relevant Orders    XR DEXA bone density    Recurrent pulmonary embolism (Multi) - Primary    Current Assessment & Plan     On lifelong anticoagulation  Sees   Cosby          Other Visit Diagnoses       Routine medical exam        Relevant Medications    mupirocin (Bactroban) 2 % cream    Other Relevant Orders    Flu vaccine, trivalent, preservative free, age 6 months and greater (Fluraix/Fluzone/Flulaval) (Completed)           Follow up with me in 3 months

## 2024-09-04 DIAGNOSIS — Z00.00 ROUTINE MEDICAL EXAM: ICD-10-CM

## 2024-09-04 RX ORDER — MUPIROCIN CALCIUM 20 MG/G
CREAM TOPICAL 3 TIMES DAILY
Qty: 30 G | Refills: 0 | Status: SHIPPED | OUTPATIENT
Start: 2024-09-04 | End: 2024-09-14

## 2024-09-06 ENCOUNTER — TELEPHONE (OUTPATIENT)
Dept: PRIMARY CARE | Facility: CLINIC | Age: 64
End: 2024-09-06
Payer: COMMERCIAL

## 2024-09-06 DIAGNOSIS — R21 RASH: Primary | ICD-10-CM

## 2024-09-06 RX ORDER — MUPIROCIN 20 MG/G
OINTMENT TOPICAL
Qty: 22 G | Refills: 2 | Status: SHIPPED | OUTPATIENT
Start: 2024-09-06

## 2024-09-06 NOTE — TELEPHONE ENCOUNTER
Patients wife called states that the Mupirocin cream is going to cost $100, would like to know if the ointment can be sent in instead of the cream. If the cream is the one he needs he will pay for it but does not want to if they don't have to.

## 2024-09-17 ENCOUNTER — HOSPITAL ENCOUNTER (OUTPATIENT)
Dept: RADIOLOGY | Facility: CLINIC | Age: 64
Discharge: HOME | End: 2024-09-17
Payer: COMMERCIAL

## 2024-09-17 DIAGNOSIS — M35.3 POLYMYALGIA RHEUMATICA (MULTI): ICD-10-CM

## 2024-09-17 PROCEDURE — 77080 DXA BONE DENSITY AXIAL: CPT

## 2024-10-17 DIAGNOSIS — F51.01 PRIMARY INSOMNIA: ICD-10-CM

## 2024-10-17 RX ORDER — ZOLPIDEM TARTRATE 5 MG/1
5 TABLET ORAL NIGHTLY PRN
Qty: 90 TABLET | Refills: 0 | Status: SHIPPED | OUTPATIENT
Start: 2024-10-17

## 2024-10-17 NOTE — TELEPHONE ENCOUNTER
Sakina patient-  Last appointment 8/28/2024  Upcoming appointment 11/21/2024  Contract and Tox 2/15/2023

## 2024-11-05 ENCOUNTER — APPOINTMENT (OUTPATIENT)
Dept: HEMATOLOGY/ONCOLOGY | Facility: CLINIC | Age: 64
End: 2024-11-05
Payer: COMMERCIAL

## 2024-11-09 DIAGNOSIS — M35.3 POLYMYALGIA RHEUMATICA (MULTI): ICD-10-CM

## 2024-11-09 RX ORDER — PREDNISONE 5 MG/1
TABLET ORAL
Qty: 270 TABLET | Refills: 0 | Status: SHIPPED | OUTPATIENT
Start: 2024-11-09

## 2024-11-20 ENCOUNTER — LAB (OUTPATIENT)
Dept: LAB | Facility: CLINIC | Age: 64
End: 2024-11-20
Payer: COMMERCIAL

## 2024-11-20 ENCOUNTER — OFFICE VISIT (OUTPATIENT)
Dept: HEMATOLOGY/ONCOLOGY | Facility: CLINIC | Age: 64
End: 2024-11-20
Payer: COMMERCIAL

## 2024-11-20 VITALS
DIASTOLIC BLOOD PRESSURE: 86 MMHG | OXYGEN SATURATION: 94 % | TEMPERATURE: 96.8 F | RESPIRATION RATE: 16 BRPM | SYSTOLIC BLOOD PRESSURE: 144 MMHG | BODY MASS INDEX: 34.07 KG/M2 | WEIGHT: 237.44 LBS | HEART RATE: 100 BPM

## 2024-11-20 DIAGNOSIS — M35.3 POLYMYALGIA RHEUMATICA (MULTI): ICD-10-CM

## 2024-11-20 DIAGNOSIS — I26.99 RECURRENT PULMONARY EMBOLISM (MULTI): Primary | ICD-10-CM

## 2024-11-20 DIAGNOSIS — F51.01 PRIMARY INSOMNIA: ICD-10-CM

## 2024-11-20 DIAGNOSIS — E55.9 VITAMIN D DEFICIENCY: ICD-10-CM

## 2024-11-20 DIAGNOSIS — I26.99 RECURRENT PULMONARY EMBOLISM (MULTI): ICD-10-CM

## 2024-11-20 LAB
BASOPHILS # BLD AUTO: 0.03 X10*3/UL (ref 0–0.1)
BASOPHILS NFR BLD AUTO: 0.3 %
EOSINOPHIL # BLD AUTO: 0.04 X10*3/UL (ref 0–0.7)
EOSINOPHIL NFR BLD AUTO: 0.4 %
ERYTHROCYTE [DISTWIDTH] IN BLOOD BY AUTOMATED COUNT: 13.4 % (ref 11.5–14.5)
HCT VFR BLD AUTO: 46.7 % (ref 41–52)
HGB BLD-MCNC: 15.4 G/DL (ref 13.5–17.5)
HOLD SPECIMEN: NORMAL
IMM GRANULOCYTES # BLD AUTO: 0.02 X10*3/UL (ref 0–0.7)
IMM GRANULOCYTES NFR BLD AUTO: 0.2 % (ref 0–0.9)
LYMPHOCYTES # BLD AUTO: 1.82 X10*3/UL (ref 1.2–4.8)
LYMPHOCYTES NFR BLD AUTO: 19.3 %
MCH RBC QN AUTO: 30.7 PG (ref 26–34)
MCHC RBC AUTO-ENTMCNC: 33 G/DL (ref 32–36)
MCV RBC AUTO: 93 FL (ref 80–100)
MONOCYTES # BLD AUTO: 0.62 X10*3/UL (ref 0.1–1)
MONOCYTES NFR BLD AUTO: 6.6 %
NEUTROPHILS # BLD AUTO: 6.91 X10*3/UL (ref 1.2–7.7)
NEUTROPHILS NFR BLD AUTO: 73.2 %
PLATELET # BLD AUTO: 228 X10*3/UL (ref 150–450)
RBC # BLD AUTO: 5.02 X10*6/UL (ref 4.5–5.9)
WBC # BLD AUTO: 9.4 X10*3/UL (ref 4.4–11.3)

## 2024-11-20 PROCEDURE — 99214 OFFICE O/P EST MOD 30 MIN: CPT | Performed by: INTERNAL MEDICINE

## 2024-11-20 PROCEDURE — 36415 COLL VENOUS BLD VENIPUNCTURE: CPT

## 2024-11-20 PROCEDURE — 3077F SYST BP >= 140 MM HG: CPT | Performed by: INTERNAL MEDICINE

## 2024-11-20 PROCEDURE — 85025 COMPLETE CBC W/AUTO DIFF WBC: CPT

## 2024-11-20 PROCEDURE — 3079F DIAST BP 80-89 MM HG: CPT | Performed by: INTERNAL MEDICINE

## 2024-11-20 ASSESSMENT — PAIN SCALES - GENERAL: PAINLEVEL_OUTOF10: 6

## 2024-11-20 NOTE — PROGRESS NOTES
Patient ID: Anton Whitt is a 64 y.o. male.  Referring Physician: Jairo Cosby MD  [address]  [place] [address]  Primary Care Provider: Adry Presley MD  Visit Type: Follow Up      Subjective    HPI I am doing okay    Review of Systems   Constitutional: Negative.    HENT:  Negative.     Eyes: Negative.    Respiratory: Negative.     Cardiovascular: Negative.    Gastrointestinal: Negative.    Endocrine: Negative.    Genitourinary: Negative.     Musculoskeletal: Negative.    Skin: Negative.    Neurological: Negative.    Hematological: Negative.    Psychiatric/Behavioral: Negative.          Objective   BSA: 2.31 meters squared  /86 (BP Location: Left arm)   Pulse 100   Temp 36 °C (96.8 °F) (Temporal)   Resp 16   Wt 108 kg (237 lb 7 oz)   SpO2 94%   BMI 34.07 kg/m²      has a past medical history of Acute embolism and thrombosis of unspecified deep veins of unspecified lower extremity (Multi) (11/11/2013), Acute upper respiratory infection, unspecified (04/25/2018), Body mass index (BMI) 35.0-35.9, adult (09/17/2018), Encounter for other preprocedural examination (06/25/2015), Encounter for other preprocedural examination (06/25/2015), Encounter for screening for cardiovascular disorders, Insect bite (nonvenomous) of lower back and pelvis, initial encounter (08/05/2014), Ocular pain, left eye (09/07/2018), Other conditions influencing health status (03/21/2017), Other polyuria (05/29/2013), Other shoulder lesions, left shoulder (04/11/2016), Other shoulder lesions, unspecified shoulder (03/13/2013), Other specified soft tissue disorders (03/21/2017), Pain in left knee (08/23/2019), Pain in unspecified knee (12/09/2014), Personal history of other diseases of the circulatory system (01/20/2020), Personal history of other diseases of the circulatory system, Personal history of other diseases of the nervous system and sense organs (05/06/2015), Personal history of other diseases of  the nervous system and sense organs, Personal history of other endocrine, nutritional and metabolic disease (04/14/2015), Personal history of other specified conditions (02/19/2020), Personal history of other specified conditions (06/26/2013), Personal history of other specified conditions, Phlebitis and thrombophlebitis of superficial vessels of left lower extremity (03/21/2017), Phlebitis and thrombophlebitis of superficial vessels of right lower extremity (03/21/2017), Phlebitis and thrombophlebitis of unspecified site, and Sigmoid diverticulitis (05/16/2023).   has a past surgical history that includes Other surgical history (04/29/2013); Knee arthroscopy w/ debridement (04/29/2013); Back surgery (10/26/2015); Other surgical history (10/26/2015); and Other surgical history (03/09/2022).  Family History   Problem Relation Name Age of Onset    Heart attack Mother      Cancer Father       Oncology History    No history exists.       Anton Whitt  reports that he has never smoked. He has never used smokeless tobacco.  He  reports current alcohol use.  He  reports no history of drug use.    Physical Exam  Vitals reviewed.   Constitutional:       Appearance: Normal appearance.   HENT:      Head: Normocephalic.      Mouth/Throat:      Mouth: Mucous membranes are moist.   Eyes:      Extraocular Movements: Extraocular movements intact.      Pupils: Pupils are equal, round, and reactive to light.   Cardiovascular:      Rate and Rhythm: Normal rate and regular rhythm.      Pulses: Normal pulses.      Heart sounds: Normal heart sounds.   Pulmonary:      Effort: Pulmonary effort is normal.      Breath sounds: Normal breath sounds.   Abdominal:      General: Bowel sounds are normal.      Palpations: Abdomen is soft.   Musculoskeletal:         General: Normal range of motion.      Cervical back: Normal range of motion and neck supple.   Skin:     General: Skin is warm.   Neurological:      General: No focal deficit present.       Mental Status: He is alert and oriented to person, place, and time.   Psychiatric:         Mood and Affect: Mood normal.         Behavior: Behavior normal.         WBC   Date/Time Value Ref Range Status   11/20/2024 03:04 PM 9.4 4.4 - 11.3 x10*3/uL Final   08/12/2024 03:29 PM 10.9 4.4 - 11.3 x10*3/uL Final   11/07/2023 10:05 AM 7.2 4.4 - 11.3 x10*3/uL Final     nRBC   Date Value Ref Range Status   08/12/2024 0.0 0.0 - 0.0 /100 WBCs Final   08/01/2023 0.0 0.0 - 0.0 /100 WBC Final   07/31/2023 0.0 0.0 - 0.0 /100 WBC Final   05/19/2023 0.0 0.0 - 0.0 /100 WBC Final     RBC   Date Value Ref Range Status   11/20/2024 5.02 4.50 - 5.90 x10*6/uL Final   08/12/2024 4.70 4.50 - 5.90 x10*6/uL Final   11/07/2023 4.55 4.50 - 5.90 x10*6/uL Final     Hemoglobin   Date Value Ref Range Status   11/20/2024 15.4 13.5 - 17.5 g/dL Final   08/12/2024 14.0 13.5 - 17.5 g/dL Final   11/07/2023 14.0 13.5 - 17.5 g/dL Final     Hematocrit   Date Value Ref Range Status   11/20/2024 46.7 41.0 - 52.0 % Final   08/12/2024 43.5 41.0 - 52.0 % Final   11/07/2023 41.2 41.0 - 52.0 % Final     MCV   Date/Time Value Ref Range Status   11/20/2024 03:04 PM 93 80 - 100 fL Final   08/12/2024 03:29 PM 93 80 - 100 fL Final   11/07/2023 10:05 AM 91 80 - 100 fL Final     MCH   Date/Time Value Ref Range Status   11/20/2024 03:04 PM 30.7 26.0 - 34.0 pg Final   08/12/2024 03:29 PM 29.8 26.0 - 34.0 pg Final   11/07/2023 10:05 AM 30.8 26.0 - 34.0 pg Final     MCHC   Date/Time Value Ref Range Status   11/20/2024 03:04 PM 33.0 32.0 - 36.0 g/dL Final   08/12/2024 03:29 PM 32.2 32.0 - 36.0 g/dL Final   11/07/2023 10:05 AM 34.0 32.0 - 36.0 g/dL Final     RDW   Date/Time Value Ref Range Status   11/20/2024 03:04 PM 13.4 11.5 - 14.5 % Final   08/12/2024 03:29 PM 13.0 11.5 - 14.5 % Final   11/07/2023 10:05 AM 13.5 11.5 - 14.5 % Final     Platelets   Date/Time Value Ref Range Status   11/20/2024 03:04  150 - 450 x10*3/uL Final   08/12/2024 03:29  150 - 450  "x10*3/uL Final   11/07/2023 10:05  150 - 450 x10*3/uL Final     No results found for: \"MPV\"  Neutrophils %   Date/Time Value Ref Range Status   11/20/2024 03:04 PM 73.2 40.0 - 80.0 % Final   08/12/2024 03:29 PM 72.7 40.0 - 80.0 % Final   11/07/2023 10:05 AM 67.2 40.0 - 80.0 % Final     Immature Granulocytes %, Automated   Date/Time Value Ref Range Status   11/20/2024 03:04 PM 0.2 0.0 - 0.9 % Final     Comment:     Immature Granulocyte Count (IG) includes promyelocytes, myelocytes and metamyelocytes but does not include bands. Percent differential counts (%) should be interpreted in the context of the absolute cell counts (cells/UL).   08/12/2024 03:29 PM 0.4 0.0 - 0.9 % Final     Comment:     Immature Granulocyte Count (IG) includes promyelocytes, myelocytes and metamyelocytes but does not include bands. Percent differential counts (%) should be interpreted in the context of the absolute cell counts (cells/UL).   11/07/2023 10:05 AM 0.0 0.0 - 0.9 % Final     Comment:     Immature Granulocyte Count (IG) includes promyelocytes, myelocytes and metamyelocytes but does not include bands. Percent differential counts (%) should be interpreted in the context of the absolute cell counts (cells/UL).     Lymphocytes %   Date/Time Value Ref Range Status   11/20/2024 03:04 PM 19.3 13.0 - 44.0 % Final   08/12/2024 03:29 PM 14.7 13.0 - 44.0 % Final   11/07/2023 10:05 AM 22.5 13.0 - 44.0 % Final     Monocytes %   Date/Time Value Ref Range Status   11/20/2024 03:04 PM 6.6 2.0 - 10.0 % Final   08/12/2024 03:29 PM 9.9 2.0 - 10.0 % Final   11/07/2023 10:05 AM 7.3 2.0 - 10.0 % Final     Eosinophils %   Date/Time Value Ref Range Status   11/20/2024 03:04 PM 0.4 0.0 - 6.0 % Final   08/12/2024 03:29 PM 1.8 0.0 - 6.0 % Final   11/07/2023 10:05 AM 2.4 0.0 - 6.0 % Final     Basophils %   Date/Time Value Ref Range Status   11/20/2024 03:04 PM 0.3 0.0 - 2.0 % Final   08/12/2024 03:29 PM 0.5 0.0 - 2.0 % Final   11/07/2023 10:05 AM 0.6 0.0 " "- 2.0 % Final     Neutrophils Absolute   Date/Time Value Ref Range Status   11/20/2024 03:04 PM 6.91 1.20 - 7.70 x10*3/uL Final     Comment:     Percent differential counts (%) should be interpreted in the context of the absolute cell counts (cells/uL).   08/12/2024 03:29 PM 7.95 (H) 1.20 - 7.70 x10*3/uL Final     Comment:     Percent differential counts (%) should be interpreted in the context of the absolute cell counts (cells/uL).   11/07/2023 10:05 AM 4.82 1.20 - 7.70 x10*3/uL Final     Comment:     Percent differential counts (%) should be interpreted in the context of the absolute cell counts (cells/uL).     Immature Granulocytes Absolute, Automated   Date/Time Value Ref Range Status   11/20/2024 03:04 PM 0.02 0.00 - 0.70 x10*3/uL Final   08/12/2024 03:29 PM 0.04 0.00 - 0.70 x10*3/uL Final   11/07/2023 10:05 AM 0.00 0.00 - 0.70 x10*3/uL Final     Lymphocytes Absolute   Date/Time Value Ref Range Status   11/20/2024 03:04 PM 1.82 1.20 - 4.80 x10*3/uL Final   08/12/2024 03:29 PM 1.61 1.20 - 4.80 x10*3/uL Final   11/07/2023 10:05 AM 1.61 1.20 - 4.80 x10*3/uL Final     Monocytes Absolute   Date/Time Value Ref Range Status   11/20/2024 03:04 PM 0.62 0.10 - 1.00 x10*3/uL Final   08/12/2024 03:29 PM 1.08 (H) 0.10 - 1.00 x10*3/uL Final   11/07/2023 10:05 AM 0.52 0.10 - 1.00 x10*3/uL Final     Eosinophils Absolute   Date/Time Value Ref Range Status   11/20/2024 03:04 PM 0.04 0.00 - 0.70 x10*3/uL Final   08/12/2024 03:29 PM 0.20 0.00 - 0.70 x10*3/uL Final   11/07/2023 10:05 AM 0.17 0.00 - 0.70 x10*3/uL Final     Basophils Absolute   Date/Time Value Ref Range Status   11/20/2024 03:04 PM 0.03 0.00 - 0.10 x10*3/uL Final   08/12/2024 03:29 PM 0.06 0.00 - 0.10 x10*3/uL Final   11/07/2023 10:05 AM 0.04 0.00 - 0.10 x10*3/uL Final       No components found for: \"PT\"  aPTT   Date/Time Value Ref Range Status   05/18/2023 08:05 AM 32 26 - 39 sec Final     Comment:       THE APTT IS NO LONGER USED FOR MONITORING     UNFRACTIONATED " HEPARIN THERAPY.    FOR MONITORING HEPARIN THERAPY,     USE THE HEPARIN ASSAY.     2023 07:05 PM 34 26 - 39 sec Final     Comment:       THE APTT IS NO LONGER USED FOR MONITORING     UNFRACTIONATED HEPARIN THERAPY.    FOR MONITORING HEPARIN THERAPY,     USE THE HEPARIN ASSAY.       Medication Documentation Review Audit       Reviewed by Adry Presley MD (Physician) on 24 at 1301      Medication Order Taking? Sig Documenting Provider Last Dose Status   aspirin 81 mg EC tablet [id number] Yes Take 1 tablet (81 mg) by mouth once daily. Historical Provider, MD  Active   calcium carbonate-vitamin D3 600 mg-5 mcg (200 unit) tablet [id number] Yes Take 1 tablet by mouth once daily. Historical Provider, MD  Active   cyclobenzaprine (Flexeril) 10 mg tablet [id number]  Take 0.5 tablets (5 mg) by mouth 3 times a day for 10 days. Tashia Ruano, APRN-CNP   24 2359   methocarbamol (Robaxin) 500 mg tablet [id number]  Take by mouth.   Patient not taking: Reported on 2024    Historical Provider, MD  Active   mupirocin (Bactroban) 2 % ointment [id number]  Apply topically 3 times daily for 1 to 2 weeks until skin concern improved   Patient not taking: Reported on 2024    Christian Jara MD  Active   predniSONE (Deltasone) 5 mg tablet [id number] Yes TAKE ONE AND ONE-HALF TABLETS BY MOUTH TWICE DAILY Jayde Bal MD  Active   rivaroxaban (Xarelto) 10 mg tablet [id number]  Take 1 tablet (10 mg) by mouth once daily. Jairo Cosby MD   24 2359   traZODone (Desyrel) 50 mg tablet [id number] Yes TAKE TWO TABLETS BY MOUTH DAILY AT BEDTIME AS NEEDED FOR SLEEPLESSNESS Adry Presley MD  Active   zolpidem (Ambien) 5 mg tablet [id number] Yes Take 1 tablet (5 mg) by mouth as needed at bedtime for sleep. Robert Musa DO  Active                   Assessment/Plan    1) recurrent pulmonary emboli  -1st event was in   -2nd event in 2018  -remains on indefinite anticoagulation with xarelto 10 mg daily  (maintenance dosing)  -today CBC was checked: wbc 9.4, hgb 15.4, plt  228,000  -has no complaints  -limited physical exam was done today--no cervical, supraclavicular, axillary adenopathy  -will refill xarelto 10 mg for another year  -will continue to see him annually     2) insomnia  -on zolpidem     3) polymyalgia rheumatica  -on prednisone  -follows with Dr Darby     4) vitamin D deficiency  -on calcium + D     Problem List Items Addressed This Visit             ICD-10-CM    Recurrent pulmonary embolism (Multi) I26.99    Relevant Orders    Clinic Appointment Request Follow Up; JAIRO COSBY; [facility]    CBC and Auto Differential            Jairo Cosby MD

## 2024-11-21 ENCOUNTER — APPOINTMENT (OUTPATIENT)
Dept: PRIMARY CARE | Facility: CLINIC | Age: 64
End: 2024-11-21
Payer: COMMERCIAL

## 2024-11-21 VITALS
HEART RATE: 81 BPM | OXYGEN SATURATION: 94 % | HEIGHT: 70 IN | BODY MASS INDEX: 34.41 KG/M2 | DIASTOLIC BLOOD PRESSURE: 78 MMHG | SYSTOLIC BLOOD PRESSURE: 135 MMHG | TEMPERATURE: 96.5 F | WEIGHT: 240.4 LBS

## 2024-11-21 DIAGNOSIS — R00.0 TACHYCARDIA: Primary | ICD-10-CM

## 2024-11-21 DIAGNOSIS — I27.82 OTHER CHRONIC PULMONARY EMBOLISM WITHOUT ACUTE COR PULMONALE: ICD-10-CM

## 2024-11-21 DIAGNOSIS — I10 PRIMARY HYPERTENSION: ICD-10-CM

## 2024-11-21 PROCEDURE — 3078F DIAST BP <80 MM HG: CPT | Performed by: INTERNAL MEDICINE

## 2024-11-21 PROCEDURE — 3008F BODY MASS INDEX DOCD: CPT | Performed by: INTERNAL MEDICINE

## 2024-11-21 PROCEDURE — 99214 OFFICE O/P EST MOD 30 MIN: CPT | Performed by: INTERNAL MEDICINE

## 2024-11-21 PROCEDURE — 3075F SYST BP GE 130 - 139MM HG: CPT | Performed by: INTERNAL MEDICINE

## 2024-11-21 ASSESSMENT — PATIENT HEALTH QUESTIONNAIRE - PHQ9
2. FEELING DOWN, DEPRESSED OR HOPELESS: NOT AT ALL
SUM OF ALL RESPONSES TO PHQ9 QUESTIONS 1 AND 2: 0
1. LITTLE INTEREST OR PLEASURE IN DOING THINGS: NOT AT ALL

## 2024-11-21 NOTE — PROGRESS NOTES
"Subjective   Patient ID: Anton Whitt is a 64 y.o. male who presents for Follow-up.    HPI   65 yo M presents for follow-up. Broke right pointer finger distal phalanx on 11/6 helping friend dock boat. Seen in ED. Had 8 stiches and splint placed. Removed stiches on his own. Having pain, but no drainage or bleeding. Follows with Dr. Aragon for PMR. Currently on prednisone taper. Has been on 10mg daily for the past week and will continue for 3 months before dropping to 7.5mg. States joint sxs are markedly improved. He has gained 12lbs since being on increased dose prednisone.    He does note that he has been intermittently tachycardic at rest at home. States his watch alerts him when his HR has been high and for the past several weeks he will go as high as 117 several times a day. Occurs at rest and not associated with CP, lightheadedness/dizziness, or SOB.     Review of Systems  Negative except as noted above    Objective   /78   Pulse 81   Temp 35.8 °C (96.5 °F)   Ht 1.778 m (5' 10\")   Wt 109 kg (240 lb 6.4 oz)   SpO2 94%   BMI 34.49 kg/m²     Physical Exam  Constitutional: NAD, pleasant  CV: RRR, no m/r/g  Pulm: CTAB, no increased WOB  GI: Soft, NT, ND  Extremities: 1+ BL edema, right pointer finger swollen and painful but incision site clean and dry, appears to be healing well  Skin: warm  Neuro: grossly alert and oriented  Psych: Mood and affect appropriate to situation     Assessment/Plan   65 yo M presents for follow-up.     #Tachycardia  -occurs at rest, asymptomatic  -previous ECGs all NSR w/ incomplete RBB  -will order ziopatch for 7 days    #Obesity, Class 1  -gained 12lbs since last visit in August  -likely in part due to increased pred dose  -discussed diet and exercise    #PMR  -follows with Dr. Aragon  -on prednisone taper, currently 10mg daily for 3 months then drop to 7.5mg    RTC in 3 months  "

## 2024-11-28 ASSESSMENT — ENCOUNTER SYMPTOMS
ENDOCRINE NEGATIVE: 1
RESPIRATORY NEGATIVE: 1
NEUROLOGICAL NEGATIVE: 1
PSYCHIATRIC NEGATIVE: 1
CARDIOVASCULAR NEGATIVE: 1
CONSTITUTIONAL NEGATIVE: 1
HEMATOLOGIC/LYMPHATIC NEGATIVE: 1
GASTROINTESTINAL NEGATIVE: 1
EYES NEGATIVE: 1
MUSCULOSKELETAL NEGATIVE: 1

## 2024-12-02 ENCOUNTER — HOSPITAL ENCOUNTER (OUTPATIENT)
Dept: CARDIOLOGY | Facility: CLINIC | Age: 64
Discharge: HOME | End: 2024-12-02
Payer: COMMERCIAL

## 2024-12-02 DIAGNOSIS — R00.0 TACHYCARDIA: ICD-10-CM

## 2024-12-02 PROCEDURE — 93242 EXT ECG>48HR<7D RECORDING: CPT

## 2024-12-09 DIAGNOSIS — I27.82 CHRONIC PULMONARY EMBOLISM, UNSPECIFIED PULMONARY EMBOLISM TYPE, UNSPECIFIED WHETHER ACUTE COR PULMONALE PRESENT (MULTI): ICD-10-CM

## 2025-01-03 DIAGNOSIS — F51.01 PRIMARY INSOMNIA: ICD-10-CM

## 2025-01-03 RX ORDER — ZOLPIDEM TARTRATE 5 MG/1
5 TABLET ORAL NIGHTLY PRN
Qty: 90 TABLET | Refills: 0 | Status: SHIPPED | OUTPATIENT
Start: 2025-01-03

## 2025-01-03 NOTE — TELEPHONE ENCOUNTER
Detail Level: Detailed Last appointment 11/21/2024  Upcoming appointment 03/03/2025  Contract and Tox 2/15/2023

## 2025-01-06 ENCOUNTER — TELEPHONE (OUTPATIENT)
Dept: PRIMARY CARE | Facility: CLINIC | Age: 65
End: 2025-01-06
Payer: COMMERCIAL

## 2025-01-08 DIAGNOSIS — R00.0 TACHYCARDIA: Primary | ICD-10-CM

## 2025-01-08 RX ORDER — METOPROLOL SUCCINATE 25 MG/1
25 TABLET, EXTENDED RELEASE ORAL DAILY
Qty: 90 TABLET | Refills: 1 | Status: SHIPPED | OUTPATIENT
Start: 2025-01-08 | End: 2025-07-07

## 2025-01-08 NOTE — TELEPHONE ENCOUNTER
Discussed with patient  Has ectopy on his Holter.  He is bothered by the variation in his pulse.  He was having similar issues in 2019 and we started him on 25 mg of metoprolol with good relief.  He decided to come off of that.  Will go ahead and restart the metoprolol for now.  He understands the side effects.

## 2025-03-03 ENCOUNTER — APPOINTMENT (OUTPATIENT)
Dept: PRIMARY CARE | Facility: CLINIC | Age: 65
End: 2025-03-03
Payer: COMMERCIAL

## 2025-03-24 DIAGNOSIS — F51.01 PRIMARY INSOMNIA: ICD-10-CM

## 2025-03-24 RX ORDER — ZOLPIDEM TARTRATE 5 MG/1
5 TABLET ORAL NIGHTLY PRN
Qty: 90 TABLET | Refills: 0 | Status: SHIPPED | OUTPATIENT
Start: 2025-03-24

## 2025-04-01 DIAGNOSIS — R00.0 TACHYCARDIA: ICD-10-CM

## 2025-04-01 RX ORDER — METOPROLOL SUCCINATE 25 MG/1
25 TABLET, EXTENDED RELEASE ORAL DAILY
Qty: 90 TABLET | Refills: 1 | Status: SHIPPED | OUTPATIENT
Start: 2025-04-01 | End: 2025-09-28

## 2025-04-04 DIAGNOSIS — M35.3 POLYMYALGIA RHEUMATICA (MULTI): ICD-10-CM

## 2025-04-04 RX ORDER — PREDNISONE 5 MG/1
TABLET ORAL
Qty: 270 TABLET | Refills: 0 | Status: SHIPPED | OUTPATIENT
Start: 2025-04-04

## 2025-04-17 ENCOUNTER — APPOINTMENT (OUTPATIENT)
Dept: PRIMARY CARE | Facility: CLINIC | Age: 65
End: 2025-04-17
Payer: COMMERCIAL

## 2025-04-17 VITALS
WEIGHT: 229.6 LBS | HEART RATE: 70 BPM | BODY MASS INDEX: 32.87 KG/M2 | DIASTOLIC BLOOD PRESSURE: 85 MMHG | HEIGHT: 70 IN | SYSTOLIC BLOOD PRESSURE: 125 MMHG | TEMPERATURE: 97.2 F | OXYGEN SATURATION: 97 %

## 2025-04-17 DIAGNOSIS — M35.3 POLYMYALGIA RHEUMATICA (MULTI): ICD-10-CM

## 2025-04-17 DIAGNOSIS — R73.03 PREDIABETES: ICD-10-CM

## 2025-04-17 DIAGNOSIS — E78.5 HYPERLIPIDEMIA, UNSPECIFIED HYPERLIPIDEMIA TYPE: ICD-10-CM

## 2025-04-17 DIAGNOSIS — R97.20 ELEVATED PSA: Primary | ICD-10-CM

## 2025-04-17 DIAGNOSIS — I26.99 RECURRENT PULMONARY EMBOLISM (MULTI): ICD-10-CM

## 2025-04-17 PROCEDURE — 3074F SYST BP LT 130 MM HG: CPT | Performed by: INTERNAL MEDICINE

## 2025-04-17 PROCEDURE — 1036F TOBACCO NON-USER: CPT | Performed by: INTERNAL MEDICINE

## 2025-04-17 PROCEDURE — 99214 OFFICE O/P EST MOD 30 MIN: CPT | Performed by: INTERNAL MEDICINE

## 2025-04-17 PROCEDURE — 3008F BODY MASS INDEX DOCD: CPT | Performed by: INTERNAL MEDICINE

## 2025-04-17 PROCEDURE — 3079F DIAST BP 80-89 MM HG: CPT | Performed by: INTERNAL MEDICINE

## 2025-04-17 RX ORDER — PREDNISONE 2.5 MG/1
2.5 TABLET ORAL 2 TIMES DAILY
Qty: 180 TABLET | Refills: 3 | Status: SHIPPED | OUTPATIENT
Start: 2025-04-17 | End: 2026-04-17

## 2025-04-17 ASSESSMENT — ENCOUNTER SYMPTOMS
COUGH: 0
SHORTNESS OF BREATH: 0
ACTIVITY CHANGE: 0
PALPITATIONS: 0
APPETITE CHANGE: 0

## 2025-04-17 NOTE — PROGRESS NOTES
Subjective   Patient ID: Anton Whitt is a 64 y.o. male who presents for Follow-up.  HPI  Here for follow up  Feels well  No complaints  Joint pain is well controlled  Metoprolol helps his palpitations    He had labs done through [facility] - brings in for eval - several pages  Abnormals -  PSA elevated - 5.7  A1C 5.8  Apolipoprotein - 99      Review of Systems   Constitutional:  Negative for activity change and appetite change.   Respiratory:  Negative for cough and shortness of breath.    Cardiovascular:  Negative for chest pain, palpitations and leg swelling.       Objective   Vitals:    04/17/25 0740   BP: 125/85   Pulse: 70   Temp: 36.2 °C (97.2 °F)   SpO2: 97%     Physical Exam  Vitals and nursing note reviewed.   Cardiovascular:      Rate and Rhythm: Normal rate and regular rhythm.      Heart sounds: Normal heart sounds.   Pulmonary:      Effort: Pulmonary effort is normal.      Breath sounds: Normal breath sounds.   Musculoskeletal:      Cervical back: Normal range of motion.   Neurological:      Mental Status: He is alert.         Assessment & Plan  Polymyalgia rheumatica (Multi)  Stable on Prednisone 6mg daily - would like to continue taper    Orders:    predniSONE (Deltasone) 2.5 mg tablet; Take 1 tablet (2.5 mg) by mouth 2 times a day.    C-reactive protein; Future    Sedimentation Rate; Future    Hemoglobin A1C; Future    Elevated PSA    Orders:    Referral to Urology; Future    PSA; Future    PSA; Future    Hyperlipidemia, unspecified hyperlipidemia type    Orders:    CT cardiac scoring wo IV contrast; Future    Recurrent pulmonary embolism (Multi)  On Xarelto       Prediabetes  Recommend recheck labs today     He will follow-up with me in 3 months or as needed

## 2025-04-17 NOTE — ASSESSMENT & PLAN NOTE
Stable on Prednisone 6mg daily - would like to continue taper    Orders:    predniSONE (Deltasone) 2.5 mg tablet; Take 1 tablet (2.5 mg) by mouth 2 times a day.    C-reactive protein; Future    Sedimentation Rate; Future    Hemoglobin A1C; Future

## 2025-04-18 LAB
CRP SERPL-MCNC: <3 MG/L
ERYTHROCYTE [SEDIMENTATION RATE] IN BLOOD BY WESTERGREN METHOD: 2 MM/H
EST. AVERAGE GLUCOSE BLD GHB EST-MCNC: 120 MG/DL
EST. AVERAGE GLUCOSE BLD GHB EST-SCNC: 6.6 MMOL/L
HBA1C MFR BLD: 5.8 %
PSA SERPL-MCNC: 6.09 NG/ML

## 2025-05-12 ENCOUNTER — APPOINTMENT (OUTPATIENT)
Dept: UROLOGY | Facility: CLINIC | Age: 65
End: 2025-05-12
Payer: COMMERCIAL

## 2025-05-12 VITALS — HEIGHT: 70 IN | WEIGHT: 227 LBS | BODY MASS INDEX: 32.5 KG/M2 | TEMPERATURE: 97.1 F

## 2025-05-12 DIAGNOSIS — R97.20 ELEVATED PSA: Primary | ICD-10-CM

## 2025-05-12 DIAGNOSIS — R39.14 BENIGN PROSTATIC HYPERPLASIA WITH INCOMPLETE BLADDER EMPTYING: ICD-10-CM

## 2025-05-12 DIAGNOSIS — N40.1 BENIGN PROSTATIC HYPERPLASIA WITH INCOMPLETE BLADDER EMPTYING: ICD-10-CM

## 2025-05-12 LAB
POC APPEARANCE, URINE: CLEAR
POC BILIRUBIN, URINE: NEGATIVE
POC BLOOD, URINE: NEGATIVE
POC COLOR, URINE: YELLOW
POC GLUCOSE, URINE: NEGATIVE MG/DL
POC KETONES, URINE: ABNORMAL MG/DL
POC LEUKOCYTES, URINE: NEGATIVE
POC NITRITE,URINE: NEGATIVE
POC PH, URINE: 6 PH
POC PROTEIN, URINE: NEGATIVE MG/DL
POC SPECIFIC GRAVITY, URINE: 1.02
POC UROBILINOGEN, URINE: 0.2 EU/DL

## 2025-05-12 PROCEDURE — 81003 URINALYSIS AUTO W/O SCOPE: CPT | Performed by: STUDENT IN AN ORGANIZED HEALTH CARE EDUCATION/TRAINING PROGRAM

## 2025-05-12 PROCEDURE — 99204 OFFICE O/P NEW MOD 45 MIN: CPT | Performed by: STUDENT IN AN ORGANIZED HEALTH CARE EDUCATION/TRAINING PROGRAM

## 2025-05-12 PROCEDURE — 1036F TOBACCO NON-USER: CPT | Performed by: STUDENT IN AN ORGANIZED HEALTH CARE EDUCATION/TRAINING PROGRAM

## 2025-05-12 PROCEDURE — 3008F BODY MASS INDEX DOCD: CPT | Performed by: STUDENT IN AN ORGANIZED HEALTH CARE EDUCATION/TRAINING PROGRAM

## 2025-05-12 ASSESSMENT — PAIN SCALES - GENERAL: PAINLEVEL_OUTOF10: 0-NO PAIN

## 2025-05-12 NOTE — PROGRESS NOTES
Scribed for Dr. Poncho Morales by Michele Tolbert. I, Dr. Poncho Morales have personally reviewed and agreed with the information entered by the Virtual Scribe. 05/12/25.    ASSESSMENT:  Problem List Items Addressed This Visit       Benign prostatic hyperplasia with incomplete bladder emptying    Elevated PSA - Primary    Relevant Orders    Measure post void residual (Completed)    POCT UA Automated manually resulted (Completed)    MR prostate screening self pay exam      PLAN:  #BPH with incomplete emptying ~  mL  #Elevated PSA  Elects to proceed with a prostate MRI in anticipation of biopsy.   If abnormal, will refer to my colleague for biopsy.   Follow up in 3-4 weeks to review results.     PSA summary:  6.09 ~ April 2025  5.7 ~ (performed at [facility])    #Renal cyst  We discussed the natural history of simple renal cysts.  Reassured that these are very common and benign.   I would not recommend further workup or surveillance for this.  Patient reassured.     All questions were answered to the patient’s satisfaction.  Patient agrees with the plan and wishes to proceed.  Continue follow-up for ongoing care of his chronic medical conditions.       History of Present Illness (HPI):  Anton presents as a new patient for an evaluation.  The patient’s EMR has been reviewed.  Lives in [place], [place].   Occupation: Owner of [employer].     TODAY: (05/12/25)  Reports he has been doing well overall.   C/o some urinary frequency, urgency, and nocturia.   However, his urinary symptoms are not bothersome.   Denies prior hx of gross hematuria or issues with UTI's.   PVR: 148 mL (abnormal).     IPSS: 12 (not bothersome)    PSA summary:  6.09 ~ April 2025  5.7 ~ (performed at [facility])    CT A&P (07/31/23) personally reviewed and independently interpreted.  Left simple renal cyst.   No renal or ureteral stones.   No hydronephrosis bilaterally.   Bladder appears unremarkable.     PMH: PE (on xarelto), HTN, Joint  pain, DONAVAN  PSH: Back surgery, knee surgery.   FH: Denies FH of  malignancy.   SH: Non-smoker, never smoked; no tobacco use.     Medical History[1]  Surgical History[2]  Family History[3]  Tobacco Use History[4]  Current Medications[5]  Allergies[6]  Past medical, surgical, family and social history in the chart was reviewed and is accurate including any additions to what is in this HPI.    REVIEW OF SYSTEMS (ROS):   Constitutional: denies any unintentional weight loss or change in strength.  Integumentary: denies any rashes or pruritus.  Eyes: denies any double vision or eye pain.  Ear/Nose/Mouth/Throat: denies any nosebleeds or gum bleeds.  Cardiovascular: denies any chest pain or syncope.  Respiratory: denies hemoptysis.  Gastrointestinal: denies nausea or vomiting.  Musculoskeletal: denies muscle cramping or weakness.  Neurologic: denies convulsions or seizures.  Hematologic/Lymphatic: denies bleeding tendencies.  Endocrine: denies heat/cold intolerance.  All other systems have been reviewed and are negative unless otherwise noted in the HPI.     OBJECTIVE:  Visit Vitals  Temp 36.2 °C (97.1 °F)     PHYSICAL EXAM:  Constitutional: No obvious distress.  Eyes: Non-injected conjunctiva, sclera clear, EOMI.  Ears/Nose/Mouth/Throat: No obvious drainage per ears or nose.  Cardiovascular: Extremities are warm and well perfused. No edema, cyanosis or pallor.  Respiratory: No audible wheezing/stridor; respirations do not appear labored.  Gastrointestinal: Abdomen soft, not distended.  Musculoskeletal: Normal ROM of extremities.  Skin: No obvious rashes or open sores.  Neurologic: Alert and oriented, CN 2-12 grossly intact.  Psychiatric: Answers questions appropriately with normal affect.  Hematologic/Lymphatic/Immunologic: No obvious bruises or sites of spontaneous bleeding.  Genitourinary: No CVA tenderness, bladder not palpable.     LABS & IMAGING:  Basic Labs:  Lab Results   Component Value Date    WBC 9.4 11/20/2024     HGB 15.4 11/20/2024    HCT 46.7 11/20/2024     11/20/2024     08/12/2024    K 4.2 08/12/2024     08/12/2024    ALT 13 08/12/2024    AST 19 08/12/2024    CREATININE 1.04 08/12/2024    BUN 18 08/12/2024    CO2 26 08/12/2024    TSH 1.27 08/12/2024    INR 1.1 05/18/2023       Scribed for Dr. Poncho Morales by Michele Tolbert.  By signing my name below, I, Nam Greco attest that this documentation has been prepared under the direction and in the presence of Poncho Morales MD. All medical record entries made by the Scribe were at my direction or personally dictated by me. I have reviewed the chart and agree that the record accurately reflects my personal performance of the history, physical exam, discussion and plan.           [1]   Past Medical History:  Diagnosis Date    Acute embolism and thrombosis of unspecified deep veins of unspecified lower extremity 11/11/2013    Acute deep vein thrombosis of lower extremity    Acute upper respiratory infection, unspecified 04/25/2018    Acute URI    Body mass index (BMI) 35.0-35.9, adult 09/17/2018    BMI 35.0-35.9,adult    Encounter for other preprocedural examination 06/25/2015    Preop testing    Encounter for other preprocedural examination 06/25/2015    Preop examination    Encounter for screening for cardiovascular disorders     Encounter for screening for cardiovascular disorders    Insect bite (nonvenomous) of lower back and pelvis, initial encounter 08/05/2014    Tick bite of back    Ocular pain, left eye 09/07/2018    Pain of left eye    Other conditions influencing health status 03/21/2017    Foot pain, unspecified laterality    Other polyuria 05/29/2013    Polyuria    Other shoulder lesions, left shoulder 04/11/2016    Tendinitis of left rotator cuff    Other shoulder lesions, unspecified shoulder 03/13/2013    Rotator cuff tendonitis    Other specified soft tissue disorders 03/21/2017    Leg swelling    Pain in left knee 08/23/2019     Left knee pain    Pain in unspecified knee 12/09/2014    Acute knee pain    Personal history of other diseases of the circulatory system 01/20/2020    History of hypertension    Personal history of other diseases of the circulatory system     History of hypertension    Personal history of other diseases of the nervous system and sense organs 05/06/2015    History of otitis media    Personal history of other diseases of the nervous system and sense organs     History of sleep apnea    Personal history of other endocrine, nutritional and metabolic disease 04/14/2015    History of obesity    Personal history of other specified conditions 02/19/2020    History of nasal congestion    Personal history of other specified conditions 06/26/2013    History of fatigue    Personal history of other specified conditions     History of insomnia    Phlebitis and thrombophlebitis of superficial vessels of left lower extremity 03/21/2017    Thrombophlebitis of superficial veins of left lower extremity    Phlebitis and thrombophlebitis of superficial vessels of right lower extremity 03/21/2017    Thrombophlebitis of superficial veins of right lower extremity    Phlebitis and thrombophlebitis of unspecified site     Superficial thrombophlebitis    Sigmoid diverticulitis 05/16/2023   [2]   Past Surgical History:  Procedure Laterality Date    BACK SURGERY  10/26/2015    Lower Back Surgery    KNEE ARTHROSCOPY W/ DEBRIDEMENT  04/29/2013    Arthroscopy Knee    OTHER SURGICAL HISTORY  04/29/2013    Total Disc Arthroplasty Lumbar    OTHER SURGICAL HISTORY  10/26/2015    Laminectomy Decompressive More Than Two Lumbar Segments    OTHER SURGICAL HISTORY  03/09/2022    Back surgery   [3]   Family History  Problem Relation Name Age of Onset    Heart attack Mother      Cancer Father     [4]   Social History  Tobacco Use   Smoking Status Never   Smokeless Tobacco Never   [5]   Current Outpatient Medications   Medication Sig Dispense Refill     aspirin 81 mg EC tablet Take 1 tablet (81 mg) by mouth once daily.      calcium carbonate-vitamin D3 600 mg-5 mcg (200 unit) tablet Take 1 tablet by mouth once daily.      metoprolol succinate XL (Toprol-XL) 25 mg 24 hr tablet Take 1 tablet (25 mg) by mouth once daily. Do not crush or chew. 90 tablet 1    predniSONE (Deltasone) 2.5 mg tablet Take 1 tablet (2.5 mg) by mouth 2 times a day. 180 tablet 3    rivaroxaban (Xarelto) 10 mg tablet Take 1 tablet (10 mg) by mouth once daily. 90 tablet 3    traZODone (Desyrel) 50 mg tablet TAKE TWO TABLETS BY MOUTH DAILY AT BEDTIME AS NEEDED FOR SLEEPLESSNESS 14 tablet 3    zolpidem (Ambien) 5 mg tablet TAKE ONE TABLET BY MOUTH AT BEDTIME AS NEEDED FOR SLEEP 90 tablet 0     No current facility-administered medications for this visit.   [6]   Allergies  Allergen Reactions    Shellfish Containing Products Unknown     All seafood    Sulfamethoxazole-Trimethoprim Hives and Rash     Bactrim

## 2025-05-16 DIAGNOSIS — G47.00 INSOMNIA, UNSPECIFIED TYPE: ICD-10-CM

## 2025-05-16 RX ORDER — TRAZODONE HYDROCHLORIDE 50 MG/1
TABLET ORAL
Qty: 180 TABLET | Refills: 0 | Status: SHIPPED | OUTPATIENT
Start: 2025-05-16

## 2025-06-02 ENCOUNTER — APPOINTMENT (OUTPATIENT)
Dept: RADIOLOGY | Facility: HOSPITAL | Age: 65
End: 2025-06-02

## 2025-06-02 DIAGNOSIS — R97.20 ELEVATED PSA: ICD-10-CM

## 2025-06-02 PROCEDURE — 6100000002 MR PROSTATE SCREENING SELF PAY EXAM

## 2025-06-04 ENCOUNTER — APPOINTMENT (OUTPATIENT)
Dept: UROLOGY | Facility: CLINIC | Age: 65
End: 2025-06-04
Payer: COMMERCIAL

## 2025-06-04 VITALS — TEMPERATURE: 98.5 F | HEART RATE: 85 BPM | DIASTOLIC BLOOD PRESSURE: 78 MMHG | SYSTOLIC BLOOD PRESSURE: 117 MMHG

## 2025-06-04 DIAGNOSIS — R97.20 ELEVATED PSA: ICD-10-CM

## 2025-06-04 DIAGNOSIS — N52.8 OTHER MALE ERECTILE DYSFUNCTION: Primary | ICD-10-CM

## 2025-06-04 LAB
POC APPEARANCE, URINE: CLEAR
POC BILIRUBIN, URINE: NEGATIVE
POC BLOOD, URINE: NEGATIVE
POC COLOR, URINE: YELLOW
POC GLUCOSE, URINE: NEGATIVE MG/DL
POC KETONES, URINE: NEGATIVE MG/DL
POC LEUKOCYTES, URINE: NEGATIVE
POC NITRITE,URINE: NEGATIVE
POC PH, URINE: 5.5 PH
POC PROTEIN, URINE: NEGATIVE MG/DL
POC SPECIFIC GRAVITY, URINE: 1.02
POC UROBILINOGEN, URINE: 0.2 EU/DL

## 2025-06-04 PROCEDURE — 81003 URINALYSIS AUTO W/O SCOPE: CPT | Performed by: STUDENT IN AN ORGANIZED HEALTH CARE EDUCATION/TRAINING PROGRAM

## 2025-06-04 PROCEDURE — 99214 OFFICE O/P EST MOD 30 MIN: CPT | Performed by: STUDENT IN AN ORGANIZED HEALTH CARE EDUCATION/TRAINING PROGRAM

## 2025-06-04 PROCEDURE — 3074F SYST BP LT 130 MM HG: CPT | Performed by: STUDENT IN AN ORGANIZED HEALTH CARE EDUCATION/TRAINING PROGRAM

## 2025-06-04 PROCEDURE — 3078F DIAST BP <80 MM HG: CPT | Performed by: STUDENT IN AN ORGANIZED HEALTH CARE EDUCATION/TRAINING PROGRAM

## 2025-06-04 RX ORDER — TADALAFIL 5 MG/1
5 TABLET ORAL DAILY
Qty: 30 TABLET | Refills: 3 | Status: SHIPPED | OUTPATIENT
Start: 2025-06-04 | End: 2025-10-02

## 2025-06-04 NOTE — PROGRESS NOTES
Scribed for Dr. Poncho Morales by Michele Tolbert. I, Dr. Poncho Morales have personally reviewed and agreed with the information entered by the Virtual Scribe. 06/04/25.    ASSESSMENT:  Problem List Items Addressed This Visit       Elevated PSA    Relevant Orders    POCT UA Automated manually resulted (Completed)     Other Visit Diagnoses         Other male erectile dysfunction    -  Primary    Relevant Medications    tadalafil (Cialis) 5 mg tablet             PLAN:  #Elevated PSA  #Prostatic lesion ~ PI-RADS 4, PI-RADS 3  Discussed his prostate MRI results which indicated PI-RADS 4 (high suspicion for clinically significant prostate Ca) and PI-RADS 3 lesions (intermediate risk). The patient understands that while imaging is highly useful, definitive diagnosis requires tissue sampling. He elects to follow up with my colleague (Dr. Garces) for further evaluation and biopsy planning.     Discussion:  Discussed the diagnosis, natural progression of progression of prostate cancer, as well as different treatment options including active surveillance, surgery, or radiation. Discussed risks and benefits of each option in detail. All questions answered.     I reviewed with him the risks of fusion guided prostate biopsy which include hematuria, rectal bleeding, UTI, urosepsis, urinary retention, discomfort, and missing prostate cancer diagnosis. Antibiotic will be administered prior to the procedure to minimize those risks. This will be scheduled with Dr. Garces under sedation.     #BPH with incomplete emptying ~  mL  #Erectile dysfunction  Discussed non-surgical versus surgical options.   Elects to trial course of tadalafil 5 mg daily for his BPH/ED.   Risks, benefits, and alternatives discussed.   May follow up with me or DR. Garces in 6-8 weeks for a med check.     Discussion:  Patient was seen today with the complaint of erectile dysfunction (ED). I went over the definition of ED, which is the inability to  obtain or maintain an erection sufficient for satisfactory sexual activity. I outlined that erectile function is a complex interplay of neural vascular, hormonal and psychological factors. Disruption in any of these pathways may lead to ED. In terms of treatment options; PDE5i (Viagra, Cialis, etc.), intracavernosal injections (ICI), vacuum erection devices (CL), and penile implants (IPP) were discussed in detail.     #Renal cyst  We discussed the natural history of simple renal cysts.  Reassured that these are very common and benign.   I would not recommend further workup or surveillance for this.  Patient reassured.     All questions were answered to the patient’s satisfaction.  Patient agrees with the plan and wishes to proceed.  Continue follow-up for ongoing care of his chronic medical conditions.       History of Present Illness (HPI):  Anton presents for a follow up visit.   The patient’s EMR has been reviewed.  Lives in [place], [place].   Occupation: Owner of [employer].     Hx of BPH (46g) with incomplete emptying, elevated PSA, OAB symptoms, and erectile dysfunction. His PSA was 6.09 in April 2025, and elected to proceed with a prostate MRI.     TODAY: (06/04/25)  Presents for follow up and MRI results.   Reports he has been doing well overall.   His urinary symptoms have remained stable.   IO urinalysis negative for blood or infection.     Reports hx of erectile dysfunction.   C/o some difficulty with achieving and maintaining.   Concerned may worsen following prostate treatment.   He has not tried any PDE5i medications in the past.     Prostate MRI (06/02/25) reviewed.   PI-RADS 4 and PI-RADS 3 lesions.   No definite signs of extracapsular extension.   No pelvic lymphadenopathy.   Prostate weight: 46g  PSA density: 0.13 ng/mL/g.     TO REVIEW: [05/12/25]  Reports he has been doing well overall.   C/o some urinary frequency, urgency, and nocturia.   However, his urinary symptoms are not  bothersome.   Denies prior hx of gross hematuria or issues with UTI's.   PVR: 148 mL (abnormal).     IPSS: 12 (not bothersome)    PSA summary:  6.09 ~ April 2025  5.7 ~ (performed at lab core)    CT A&P (07/31/23) personally reviewed and independently interpreted.  Left simple renal cyst.   No renal or ureteral stones.   No hydronephrosis bilaterally.   Bladder appears unremarkable.     PMH: PE (on xarelto), HTN, Joint pain, DONAVAN  PSH: Back surgery, knee surgery.   FH: Denies FH of  malignancy.   SH: Non-smoker, never smoked; no tobacco use.     Medical History[1]  Surgical History[2]  Family History[3]  Tobacco Use History[4]  Current Medications[5]  Allergies[6]  Past medical, surgical, family and social history in the chart was reviewed and is accurate including any additions to what is in this HPI.    REVIEW OF SYSTEMS (ROS):   Constitutional: denies any unintentional weight loss or change in strength.  Integumentary: denies any rashes or pruritus.  Eyes: denies any double vision or eye pain.  Ear/Nose/Mouth/Throat: denies any nosebleeds or gum bleeds.  Cardiovascular: denies any chest pain or syncope.  Respiratory: denies hemoptysis.  Gastrointestinal: denies nausea or vomiting.  Musculoskeletal: denies muscle cramping or weakness.  Neurologic: denies convulsions or seizures.  Hematologic/Lymphatic: denies bleeding tendencies.  Endocrine: denies heat/cold intolerance.  All other systems have been reviewed and are negative unless otherwise noted in the HPI.     OBJECTIVE:  Visit Vitals  /78   Pulse 85   Temp 36.9 °C (98.5 °F) (Temporal)       PHYSICAL EXAM:  Constitutional: No obvious distress.  Eyes: Non-injected conjunctiva, sclera clear, EOMI.  Ears/Nose/Mouth/Throat: No obvious drainage per ears or nose.  Cardiovascular: Extremities are warm and well perfused. No edema, cyanosis or pallor.  Respiratory: No audible wheezing/stridor; respirations do not appear labored.  Gastrointestinal: Abdomen soft,  not distended.  Musculoskeletal: Normal ROM of extremities.  Skin: No obvious rashes or open sores.  Neurologic: Alert and oriented, CN 2-12 grossly intact.  Psychiatric: Answers questions appropriately with normal affect.  Hematologic/Lymphatic/Immunologic: No obvious bruises or sites of spontaneous bleeding.  Genitourinary: No CVA tenderness, bladder not palpable.     LABS & IMAGING:  Basic Labs:  Lab Results   Component Value Date    WBC 9.4 11/20/2024    HGB 15.4 11/20/2024    HCT 46.7 11/20/2024     11/20/2024     08/12/2024    K 4.2 08/12/2024     08/12/2024    ALT 13 08/12/2024    AST 19 08/12/2024    CREATININE 1.04 08/12/2024    BUN 18 08/12/2024    CO2 26 08/12/2024    TSH 1.27 08/12/2024    INR 1.1 05/18/2023     Scribed for Dr. Poncho Morales by Michele Tolbert.  By signing my name below, I, Mynor Grecoibasif attest that this documentation has been prepared under the direction and in the presence of Poncho Morales MD. All medical record entries made by the Scribe were at my direction or personally dictated by me. I have reviewed the chart and agree that the record accurately reflects my personal performance of the history, physical exam, discussion and plan.           [1]   Past Medical History:  Diagnosis Date    Acute embolism and thrombosis of unspecified deep veins of unspecified lower extremity 11/11/2013    Acute deep vein thrombosis of lower extremity    Acute upper respiratory infection, unspecified 04/25/2018    Acute URI    Body mass index (BMI) 35.0-35.9, adult 09/17/2018    BMI 35.0-35.9,adult    Encounter for other preprocedural examination 06/25/2015    Preop testing    Encounter for other preprocedural examination 06/25/2015    Preop examination    Encounter for screening for cardiovascular disorders     Encounter for screening for cardiovascular disorders    Insect bite (nonvenomous) of lower back and pelvis, initial encounter 08/05/2014    Tick bite of back    Ocular  pain, left eye 09/07/2018    Pain of left eye    Other conditions influencing health status 03/21/2017    Foot pain, unspecified laterality    Other polyuria 05/29/2013    Polyuria    Other shoulder lesions, left shoulder 04/11/2016    Tendinitis of left rotator cuff    Other shoulder lesions, unspecified shoulder 03/13/2013    Rotator cuff tendonitis    Other specified soft tissue disorders 03/21/2017    Leg swelling    Pain in left knee 08/23/2019    Left knee pain    Pain in unspecified knee 12/09/2014    Acute knee pain    Personal history of other diseases of the circulatory system 01/20/2020    History of hypertension    Personal history of other diseases of the circulatory system     History of hypertension    Personal history of other diseases of the nervous system and sense organs 05/06/2015    History of otitis media    Personal history of other diseases of the nervous system and sense organs     History of sleep apnea    Personal history of other endocrine, nutritional and metabolic disease 04/14/2015    History of obesity    Personal history of other specified conditions 02/19/2020    History of nasal congestion    Personal history of other specified conditions 06/26/2013    History of fatigue    Personal history of other specified conditions     History of insomnia    Phlebitis and thrombophlebitis of superficial vessels of left lower extremity 03/21/2017    Thrombophlebitis of superficial veins of left lower extremity    Phlebitis and thrombophlebitis of superficial vessels of right lower extremity 03/21/2017    Thrombophlebitis of superficial veins of right lower extremity    Phlebitis and thrombophlebitis of unspecified site     Superficial thrombophlebitis    Sigmoid diverticulitis 05/16/2023   [2]   Past Surgical History:  Procedure Laterality Date    BACK SURGERY  10/26/2015    Lower Back Surgery    KNEE ARTHROSCOPY W/ DEBRIDEMENT  04/29/2013    Arthroscopy Knee    OTHER SURGICAL HISTORY   04/29/2013    Total Disc Arthroplasty Lumbar    OTHER SURGICAL HISTORY  10/26/2015    Laminectomy Decompressive More Than Two Lumbar Segments    OTHER SURGICAL HISTORY  03/09/2022    Back surgery   [3]   Family History  Problem Relation Name Age of Onset    Heart attack Mother      Cancer Father     [4]   Social History  Tobacco Use   Smoking Status Never   Smokeless Tobacco Never   [5]   Current Outpatient Medications   Medication Sig Dispense Refill    aspirin 81 mg EC tablet Take 1 tablet (81 mg) by mouth once daily.      calcium carbonate-vitamin D3 600 mg-5 mcg (200 unit) tablet Take 1 tablet by mouth once daily.      metoprolol succinate XL (Toprol-XL) 25 mg 24 hr tablet Take 1 tablet (25 mg) by mouth once daily. Do not crush or chew. 90 tablet 1    predniSONE (Deltasone) 2.5 mg tablet Take 1 tablet (2.5 mg) by mouth 2 times a day. 180 tablet 3    rivaroxaban (Xarelto) 10 mg tablet Take 1 tablet (10 mg) by mouth once daily. 90 tablet 3    traZODone (Desyrel) 50 mg tablet TAKE TWO TABLETS BY MOUTH DAILY AT BEDTIME AS NEEDED FOR SLEEPLESSNESS 180 tablet 0    zolpidem (Ambien) 5 mg tablet TAKE ONE TABLET BY MOUTH AT BEDTIME AS NEEDED FOR SLEEP 90 tablet 0     No current facility-administered medications for this visit.   [6]   Allergies  Allergen Reactions    Shellfish Containing Products Unknown     All seafood    Sulfamethoxazole-Trimethoprim Hives and Rash     Bactrim

## 2025-06-11 ENCOUNTER — APPOINTMENT (OUTPATIENT)
Dept: UROLOGY | Facility: CLINIC | Age: 65
End: 2025-06-11
Payer: COMMERCIAL

## 2025-06-11 DIAGNOSIS — R97.20 ELEVATED PSA: Primary | ICD-10-CM

## 2025-06-11 PROCEDURE — 99203 OFFICE O/P NEW LOW 30 MIN: CPT | Performed by: STUDENT IN AN ORGANIZED HEALTH CARE EDUCATION/TRAINING PROGRAM

## 2025-06-11 RX ORDER — CHLORHEXIDINE GLUCONATE 40 MG/ML
SOLUTION TOPICAL DAILY PRN
OUTPATIENT
Start: 2025-06-11

## 2025-06-11 RX ORDER — CIPROFLOXACIN 500 MG/1
TABLET, FILM COATED ORAL
Qty: 2 TABLET | Refills: 0 | Status: SHIPPED | OUTPATIENT
Start: 2025-06-11

## 2025-06-11 NOTE — H&P (VIEW-ONLY)
Chief complaint:  Follow-up (Prostate Biopsy discussion)  Referring physician:  No ref. provider found     Audio/video telehealth visit.  Patient identity and consent for this visit type confirmed.  Time in discussion with patient 20 min.    SUBJECTIVE:  HPI:  Anton Whitt is a 65 y.o. male with a history of HTN, HLD, PE on Xarelto, on ppx ASA 81mg, OA with spinal stenosis, DONAVAN, BPH (46g), ED, elevated PSA who presents for initial evaluation of elevated PSA, abnormal prostate MRI, discussion of prostate biopsy.    Referred by Dr. Morales.  Doing well.  Curious about prostate biopsy process.    PSA summary:  6.09 ~ April 2025  5.7 ~ (lab [facility])     (06/04/25)  Started tadalafil 5mg daily for ED and BPH     Prostate MRI (06/02/25) reviewed.   PI-RADS 4 and PI-RADS 3 lesions.   No definite signs of extracapsular extension.   No pelvic lymphadenopathy.   Prostate weight: 46g  PSA density: 0.13 ng/mL/g.     [05/12/25]  C/o some urinary frequency, urgency, and nocturia. Not bothersome.   Denies prior hx of gross hematuria or issues with UTIs.   H/o mild ED  PVR: 148 mL (abnormal).   IPSS: 12 (not bothersome)     CT A&P (07/31/23)  Left simple renal cyst.   No renal or ureteral stones.   No hydronephrosis bilaterally.   Bladder appears unremarkable.     Medical history:   has a past medical history of Acute embolism and thrombosis of unspecified deep veins of unspecified lower extremity (11/11/2013), Acute upper respiratory infection, unspecified (04/25/2018), Body mass index (BMI) 35.0-35.9, adult (09/17/2018), Encounter for other preprocedural examination (06/25/2015), Encounter for other preprocedural examination (06/25/2015), Encounter for screening for cardiovascular disorders, Insect bite (nonvenomous) of lower back and pelvis, initial encounter (08/05/2014), Ocular pain, left eye (09/07/2018), Other conditions influencing health status (03/21/2017), Other polyuria (05/29/2013), Other shoulder lesions, left  shoulder (04/11/2016), Other shoulder lesions, unspecified shoulder (03/13/2013), Other specified soft tissue disorders (03/21/2017), Pain in left knee (08/23/2019), Pain in unspecified knee (12/09/2014), Personal history of other diseases of the circulatory system (01/20/2020), Personal history of other diseases of the circulatory system, Personal history of other diseases of the nervous system and sense organs (05/06/2015), Personal history of other diseases of the nervous system and sense organs, Personal history of other endocrine, nutritional and metabolic disease (04/14/2015), Personal history of other specified conditions (02/19/2020), Personal history of other specified conditions (06/26/2013), Personal history of other specified conditions, Phlebitis and thrombophlebitis of superficial vessels of left lower extremity (03/21/2017), Phlebitis and thrombophlebitis of superficial vessels of right lower extremity (03/21/2017), Phlebitis and thrombophlebitis of unspecified site, and Sigmoid diverticulitis (05/16/2023).   Surgical history:   has a past surgical history that includes Other surgical history (04/29/2013); Knee arthroscopy w/ debridement (04/29/2013); Back surgery (10/26/2015); Other surgical history (10/26/2015); and Other surgical history (03/09/2022).  Family history:  family history includes Cancer in his father; Heart attack in his mother.  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 He has never used smokeless tobacco. He reports current alcohol use. He reports that he does not use drugs.    Medications:    Current Outpatient Medications   Medication Instructions    aspirin 81 mg EC tablet 1 tablet, Daily    calcium carbonate-vitamin D3 600 mg-5 mcg (200 unit) tablet 1 tablet, Daily    ciprofloxacin (Cipro) 500 mg tablet Take 1 by mouth the night before and the morning of the prostate biopsy    metoprolol succinate XL (TOPROL-XL) 25 mg, oral, Daily, Do not crush or  "chew.    predniSONE (DELTASONE) 2.5 mg, oral, 2 times daily    rivaroxaban (XARELTO) 10 mg, oral, Daily    tadalafil (CIALIS) 5 mg, oral, Daily    traZODone (Desyrel) 50 mg tablet TAKE TWO TABLETS BY MOUTH DAILY AT BEDTIME AS NEEDED FOR SLEEPLESSNESS    zolpidem (AMBIEN) 5 mg, oral, Nightly PRN      Allergies:    RX Allergies[1]     ROS:  14-point review of systems negative except as noted above.    OBJECTIVE:  There were no vitals taken for this visit.There is no height or weight on file to calculate BMI.    Physical exam  General:  No acute distress  HEENT:  EOMI  Pulm:  Nonlabored respirations  MSK:  No contractures  Neuro:  Motor intact  Psych:  Appropriate affect    Labs:    Lab Results   Component Value Date    WBC 9.4 11/20/2024    HGB 15.4 11/20/2024    HCT 46.7 11/20/2024     11/20/2024    ALT 13 08/12/2024    AST 19 08/12/2024     08/12/2024    K 4.2 08/12/2024     08/12/2024    CREATININE 1.04 08/12/2024    BUN 18 08/12/2024    CO2 26 08/12/2024    INR 1.1 05/18/2023    HGBA1C 5.8 (H) 04/17/2025   No results found for: \"URINECULTURE\"   Lab Results   Component Value Date    PSA 6.09 (H) 04/17/2025     Imaging:  All imaging discussed in HPI was independently reviewed.    ASSESSMENT/PLAN:  Elevated PSA  Abnormal prostate MRI    Extensively discussed role of prostate biopsy including:  Necessity of biopsy to diagnose or exclude malignancy.    Risks including bleeding and infection.    Expected periprocedural course.    Timing of pathology review.   Possible next steps if prostate cancer is diagnosed including risk stratification and staging.    Sent cipro and enema  Anticoagulation: hold Xarelto 2d, continue aspirin 81mg  Schedule MRI fusion transrectal TRUS PNB at [facility] on 7/8  Follow-up 3 weeks after path review    Jose Garces MD    Problem List Items Addressed This Visit       Elevated PSA - Primary    Relevant Medications    ciprofloxacin (Cipro) 500 mg tablet    Other " Relevant Orders    Case Request Operating Room: Biopsy Prostate with Navigation (Completed)             [1]   Allergies  Allergen Reactions    Shellfish Containing Products Unknown     All seafood    Sulfamethoxazole-Trimethoprim Hives and Rash     Bactrim

## 2025-06-11 NOTE — PROGRESS NOTES
Chief complaint:  Follow-up (Prostate Biopsy discussion)  Referring physician:  No ref. provider found     Audio/video telehealth visit.  Patient identity and consent for this visit type confirmed.  Time in discussion with patient 20 min.    SUBJECTIVE:  HPI:  Anton Whitt is a 65 y.o. male with a history of HTN, HLD, PE on Xarelto, on ppx ASA 81mg, OA with spinal stenosis, DONAVAN, BPH (46g), ED, elevated PSA who presents for initial evaluation of elevated PSA, abnormal prostate MRI, discussion of prostate biopsy.    Referred by Dr. Morales.  Doing well.  Curious about prostate biopsy process.    PSA summary:  6.09 ~ April 2025  5.7 ~ (lab [facility])     (06/04/25)  Started tadalafil 5mg daily for ED and BPH     Prostate MRI (06/02/25) reviewed.   PI-RADS 4 and PI-RADS 3 lesions.   No definite signs of extracapsular extension.   No pelvic lymphadenopathy.   Prostate weight: 46g  PSA density: 0.13 ng/mL/g.     [05/12/25]  C/o some urinary frequency, urgency, and nocturia. Not bothersome.   Denies prior hx of gross hematuria or issues with UTIs.   H/o mild ED  PVR: 148 mL (abnormal).   IPSS: 12 (not bothersome)     CT A&P (07/31/23)  Left simple renal cyst.   No renal or ureteral stones.   No hydronephrosis bilaterally.   Bladder appears unremarkable.     Medical history:   has a past medical history of Acute embolism and thrombosis of unspecified deep veins of unspecified lower extremity (11/11/2013), Acute upper respiratory infection, unspecified (04/25/2018), Body mass index (BMI) 35.0-35.9, adult (09/17/2018), Encounter for other preprocedural examination (06/25/2015), Encounter for other preprocedural examination (06/25/2015), Encounter for screening for cardiovascular disorders, Insect bite (nonvenomous) of lower back and pelvis, initial encounter (08/05/2014), Ocular pain, left eye (09/07/2018), Other conditions influencing health status (03/21/2017), Other polyuria (05/29/2013), Other shoulder lesions, left  shoulder (04/11/2016), Other shoulder lesions, unspecified shoulder (03/13/2013), Other specified soft tissue disorders (03/21/2017), Pain in left knee (08/23/2019), Pain in unspecified knee (12/09/2014), Personal history of other diseases of the circulatory system (01/20/2020), Personal history of other diseases of the circulatory system, Personal history of other diseases of the nervous system and sense organs (05/06/2015), Personal history of other diseases of the nervous system and sense organs, Personal history of other endocrine, nutritional and metabolic disease (04/14/2015), Personal history of other specified conditions (02/19/2020), Personal history of other specified conditions (06/26/2013), Personal history of other specified conditions, Phlebitis and thrombophlebitis of superficial vessels of left lower extremity (03/21/2017), Phlebitis and thrombophlebitis of superficial vessels of right lower extremity (03/21/2017), Phlebitis and thrombophlebitis of unspecified site, and Sigmoid diverticulitis (05/16/2023).   Surgical history:   has a past surgical history that includes Other surgical history (04/29/2013); Knee arthroscopy w/ debridement (04/29/2013); Back surgery (10/26/2015); Other surgical history (10/26/2015); and Other surgical history (03/09/2022).  Family history:  family history includes Cancer in his father; Heart attack in his mother.  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 He has never used smokeless tobacco. He reports current alcohol use. He reports that he does not use drugs.    Medications:    Current Outpatient Medications   Medication Instructions    aspirin 81 mg EC tablet 1 tablet, Daily    calcium carbonate-vitamin D3 600 mg-5 mcg (200 unit) tablet 1 tablet, Daily    ciprofloxacin (Cipro) 500 mg tablet Take 1 by mouth the night before and the morning of the prostate biopsy    metoprolol succinate XL (TOPROL-XL) 25 mg, oral, Daily, Do not crush or  "chew.    predniSONE (DELTASONE) 2.5 mg, oral, 2 times daily    rivaroxaban (XARELTO) 10 mg, oral, Daily    tadalafil (CIALIS) 5 mg, oral, Daily    traZODone (Desyrel) 50 mg tablet TAKE TWO TABLETS BY MOUTH DAILY AT BEDTIME AS NEEDED FOR SLEEPLESSNESS    zolpidem (AMBIEN) 5 mg, oral, Nightly PRN      Allergies:    RX Allergies[1]     ROS:  14-point review of systems negative except as noted above.    OBJECTIVE:  There were no vitals taken for this visit.There is no height or weight on file to calculate BMI.    Physical exam  General:  No acute distress  HEENT:  EOMI  Pulm:  Nonlabored respirations  MSK:  No contractures  Neuro:  Motor intact  Psych:  Appropriate affect    Labs:    Lab Results   Component Value Date    WBC 9.4 11/20/2024    HGB 15.4 11/20/2024    HCT 46.7 11/20/2024     11/20/2024    ALT 13 08/12/2024    AST 19 08/12/2024     08/12/2024    K 4.2 08/12/2024     08/12/2024    CREATININE 1.04 08/12/2024    BUN 18 08/12/2024    CO2 26 08/12/2024    INR 1.1 05/18/2023    HGBA1C 5.8 (H) 04/17/2025   No results found for: \"URINECULTURE\"   Lab Results   Component Value Date    PSA 6.09 (H) 04/17/2025     Imaging:  All imaging discussed in HPI was independently reviewed.    ASSESSMENT/PLAN:  Elevated PSA  Abnormal prostate MRI    Extensively discussed role of prostate biopsy including:  Necessity of biopsy to diagnose or exclude malignancy.    Risks including bleeding and infection.    Expected periprocedural course.    Timing of pathology review.   Possible next steps if prostate cancer is diagnosed including risk stratification and staging.    Sent cipro and enema  Anticoagulation: hold Xarelto 2d, continue aspirin 81mg  Schedule MRI fusion transrectal TRUS PNB at [facility] on 7/8  Follow-up 3 weeks after path review    Jose Garces MD    Problem List Items Addressed This Visit       Elevated PSA - Primary    Relevant Medications    ciprofloxacin (Cipro) 500 mg tablet    Other " Relevant Orders    Case Request Operating Room: Biopsy Prostate with Navigation (Completed)             [1]   Allergies  Allergen Reactions    Shellfish Containing Products Unknown     All seafood    Sulfamethoxazole-Trimethoprim Hives and Rash     Bactrim

## 2025-06-16 ENCOUNTER — APPOINTMENT (OUTPATIENT)
Dept: UROLOGY | Facility: CLINIC | Age: 65
End: 2025-06-16
Payer: COMMERCIAL

## 2025-06-30 ENCOUNTER — TELEPHONE (OUTPATIENT)
Dept: PRIMARY CARE | Facility: CLINIC | Age: 65
End: 2025-06-30

## 2025-06-30 ENCOUNTER — OFFICE VISIT (OUTPATIENT)
Dept: PRIMARY CARE | Facility: CLINIC | Age: 65
End: 2025-06-30
Payer: COMMERCIAL

## 2025-06-30 VITALS
SYSTOLIC BLOOD PRESSURE: 124 MMHG | WEIGHT: 231.2 LBS | HEIGHT: 70 IN | OXYGEN SATURATION: 96 % | HEART RATE: 74 BPM | TEMPERATURE: 97.5 F | BODY MASS INDEX: 33.1 KG/M2 | DIASTOLIC BLOOD PRESSURE: 76 MMHG

## 2025-06-30 DIAGNOSIS — M35.3 POLYMYALGIA RHEUMATICA (MULTI): ICD-10-CM

## 2025-06-30 DIAGNOSIS — M54.9 UPPER BACK PAIN ON RIGHT SIDE: Primary | ICD-10-CM

## 2025-06-30 DIAGNOSIS — F51.01 PRIMARY INSOMNIA: ICD-10-CM

## 2025-06-30 DIAGNOSIS — R00.0 TACHYCARDIA: ICD-10-CM

## 2025-06-30 PROCEDURE — 99214 OFFICE O/P EST MOD 30 MIN: CPT | Performed by: INTERNAL MEDICINE

## 2025-06-30 PROCEDURE — 1036F TOBACCO NON-USER: CPT | Performed by: INTERNAL MEDICINE

## 2025-06-30 PROCEDURE — 3078F DIAST BP <80 MM HG: CPT | Performed by: INTERNAL MEDICINE

## 2025-06-30 PROCEDURE — 3074F SYST BP LT 130 MM HG: CPT | Performed by: INTERNAL MEDICINE

## 2025-06-30 PROCEDURE — 3008F BODY MASS INDEX DOCD: CPT | Performed by: INTERNAL MEDICINE

## 2025-06-30 RX ORDER — PREDNISONE 20 MG/1
TABLET ORAL
Qty: 15 TABLET | Refills: 0 | Status: SHIPPED | OUTPATIENT
Start: 2025-06-30 | End: 2025-07-12

## 2025-06-30 RX ORDER — CYCLOBENZAPRINE HCL 5 MG
5 TABLET ORAL NIGHTLY PRN
Qty: 30 TABLET | Refills: 0 | Status: SHIPPED | OUTPATIENT
Start: 2025-06-30 | End: 2025-08-29

## 2025-06-30 RX ORDER — ZOLPIDEM TARTRATE 5 MG/1
5 TABLET ORAL NIGHTLY PRN
Qty: 90 TABLET | Refills: 1 | Status: SHIPPED | OUTPATIENT
Start: 2025-06-30

## 2025-06-30 RX ORDER — METOPROLOL SUCCINATE 25 MG/1
25 TABLET, EXTENDED RELEASE ORAL DAILY
Qty: 90 TABLET | Refills: 3 | Status: SHIPPED
Start: 2025-06-30 | End: 2025-07-02 | Stop reason: SDUPTHER

## 2025-06-30 RX ORDER — PREDNISONE 1 MG/1
2 TABLET ORAL 2 TIMES DAILY
Qty: 360 TABLET | Refills: 3 | Status: SHIPPED | OUTPATIENT
Start: 2025-06-30 | End: 2026-06-30

## 2025-06-30 NOTE — PROGRESS NOTES
"Subjective   Patient ID: Anton Whitt is a 65 y.o. male who presents for Flank Pain.  HPI  History of Present Illness  The patient is a 65-year-old male who comes in today for follow-up.    Pain  - The patient suspects the pain to be renal in origin.  - The discomfort is not sharp or acute but has been present for approximately 3 weeks.  - Initially, the pain was primarily bothersome at night, particularly when lying on his right side or stomach.  - He has been sleeping in a zero-gravity recliner chair due to the pain.  - The pain has since progressed and now also occurs during the day.  - He reports no hematuria or symptoms suggestive of kidney stones.  - He does not experience pain while walking.    Hematoma  - The patient mentions a small hematoma that ruptured and bled.  - He believes the hematoma may be related to his use of Xarelto.    Medication  - He is currently on a regimen of prednisone 5 mg, divided into two doses of 2.5 mg each.  - He expresses a preference for reducing the dose to 1 mg and eventually returning to the previously effective dose of 2 mg.     Review of Systems    Objective   Visit Vitals  /76   Pulse 74   Temp 36.4 °C (97.5 °F)   Ht 1.778 m (5' 10\")   Wt 105 kg (231 lb 3.2 oz)   SpO2 96%   BMI 33.17 kg/m²   Smoking Status Never   BSA 2.28 m²      Patient Health Questionnaire-2 Score: 0 (6/30/2025  1:19 PM)     Physical Exam  Musculoskeletal:      Cervical back: Normal.      Thoracic back: Spasms and tenderness present. No swelling or edema.         Results       Assessment & Plan  1. Suspected muscular pain  - Pain started 3 weeks ago, initially bothersome at night, now progressively worse during the day  - Ultrasound of the kidneys will be ordered to investigate the cause of the pain  - Prednisone regimen prescribed: 40 mg for 3 days, then 30 mg for 3 days, and tapering back to 2.5 mg  - Prescription for 1 mg tablets sent to pharmacy for future tapering    2. PMR  - Currently " taking prednisone 5 mg (2.5 mg twice daily)  - Prefers to switch to 1 mg tablets for easier dose adjustment  - Prescription for prednisone 1 mg tablets sent to pharmacy     1. Upper back pain on right side  US renal complete    predniSONE (Deltasone) 20 mg tablet    cyclobenzaprine (Flexeril) 5 mg tablet      2. Primary insomnia  zolpidem (Ambien) 5 mg tablet      3. Tachycardia  metoprolol succinate XL (Toprol-XL) 25 mg 24 hr tablet      4. Polymyalgia rheumatica (Multi)  predniSONE (Deltasone) 1 mg tablet        Follow up as scheduled  Adry Presley MD   This medical note was created with the assistance of artificial intelligence (AI) for documentation purposes. The content has been reviewed and confirmed by the healthcare provider for accuracy and completeness. Patient consented to the use of audio recording and use of AI during their visit.

## 2025-06-30 NOTE — TELEPHONE ENCOUNTER
Kidney pain x3 weeks/sleeps in chair/cardiac score appt on July 3rd. Patient would like an MRI of kidney to get done the same day of the cardiac score test.    Advised patients wife that it will require an appointment, and also the insurance company usually requires a 14-day for approval. Please advise of when patient can be seen.

## 2025-07-02 DIAGNOSIS — R00.0 TACHYCARDIA: ICD-10-CM

## 2025-07-02 RX ORDER — METOPROLOL SUCCINATE 25 MG/1
25 TABLET, EXTENDED RELEASE ORAL DAILY
Qty: 30 TABLET | Refills: 0 | Status: SHIPPED | OUTPATIENT
Start: 2025-07-02 | End: 2025-08-01

## 2025-07-03 ENCOUNTER — HOSPITAL ENCOUNTER (OUTPATIENT)
Dept: RADIOLOGY | Facility: CLINIC | Age: 65
Discharge: HOME | End: 2025-07-03
Payer: COMMERCIAL

## 2025-07-03 DIAGNOSIS — M54.9 UPPER BACK PAIN ON RIGHT SIDE: ICD-10-CM

## 2025-07-03 DIAGNOSIS — E78.5 HYPERLIPIDEMIA, UNSPECIFIED HYPERLIPIDEMIA TYPE: ICD-10-CM

## 2025-07-03 PROCEDURE — 76770 US EXAM ABDO BACK WALL COMP: CPT | Performed by: STUDENT IN AN ORGANIZED HEALTH CARE EDUCATION/TRAINING PROGRAM

## 2025-07-03 PROCEDURE — 75571 CT HRT W/O DYE W/CA TEST: CPT

## 2025-07-03 PROCEDURE — 76770 US EXAM ABDO BACK WALL COMP: CPT

## 2025-07-08 ENCOUNTER — ANESTHESIA (OUTPATIENT)
Dept: OPERATING ROOM | Facility: CLINIC | Age: 65
End: 2025-07-08
Payer: COMMERCIAL

## 2025-07-08 ENCOUNTER — ANESTHESIA EVENT (OUTPATIENT)
Dept: OPERATING ROOM | Facility: CLINIC | Age: 65
End: 2025-07-08
Payer: COMMERCIAL

## 2025-07-08 ENCOUNTER — HOSPITAL ENCOUNTER (OUTPATIENT)
Facility: CLINIC | Age: 65
Setting detail: OUTPATIENT SURGERY
Discharge: HOME | End: 2025-07-08
Attending: STUDENT IN AN ORGANIZED HEALTH CARE EDUCATION/TRAINING PROGRAM | Admitting: STUDENT IN AN ORGANIZED HEALTH CARE EDUCATION/TRAINING PROGRAM
Payer: COMMERCIAL

## 2025-07-08 VITALS
SYSTOLIC BLOOD PRESSURE: 152 MMHG | BODY MASS INDEX: 33.42 KG/M2 | OXYGEN SATURATION: 99 % | DIASTOLIC BLOOD PRESSURE: 98 MMHG | RESPIRATION RATE: 16 BRPM | HEIGHT: 70 IN | WEIGHT: 233.47 LBS | TEMPERATURE: 96.8 F | HEART RATE: 58 BPM

## 2025-07-08 DIAGNOSIS — R97.20 ELEVATED PSA: ICD-10-CM

## 2025-07-08 PROCEDURE — 2500000004 HC RX 250 GENERAL PHARMACY W/ HCPCS (ALT 636 FOR OP/ED): Performed by: STUDENT IN AN ORGANIZED HEALTH CARE EDUCATION/TRAINING PROGRAM

## 2025-07-08 PROCEDURE — 7100000010 HC PHASE TWO TIME - EACH INCREMENTAL 1 MINUTE: Performed by: STUDENT IN AN ORGANIZED HEALTH CARE EDUCATION/TRAINING PROGRAM

## 2025-07-08 PROCEDURE — 3600000009 HC OR TIME - EACH INCREMENTAL 1 MINUTE - PROCEDURE LEVEL FOUR: Performed by: STUDENT IN AN ORGANIZED HEALTH CARE EDUCATION/TRAINING PROGRAM

## 2025-07-08 PROCEDURE — 3600000004 HC OR TIME - INITIAL BASE CHARGE - PROCEDURE LEVEL FOUR: Performed by: STUDENT IN AN ORGANIZED HEALTH CARE EDUCATION/TRAINING PROGRAM

## 2025-07-08 PROCEDURE — 2500000005 HC RX 250 GENERAL PHARMACY W/O HCPCS: Performed by: STUDENT IN AN ORGANIZED HEALTH CARE EDUCATION/TRAINING PROGRAM

## 2025-07-08 PROCEDURE — 2500000004 HC RX 250 GENERAL PHARMACY W/ HCPCS (ALT 636 FOR OP/ED)

## 2025-07-08 PROCEDURE — 88305 TISSUE EXAM BY PATHOLOGIST: CPT | Mod: TC,SUR | Performed by: STUDENT IN AN ORGANIZED HEALTH CARE EDUCATION/TRAINING PROGRAM

## 2025-07-08 PROCEDURE — A55700 PR BIOPSY OF PROSTATE,NEEDLE/PUNCH: Performed by: ANESTHESIOLOGY

## 2025-07-08 PROCEDURE — 3700000002 HC GENERAL ANESTHESIA TIME - EACH INCREMENTAL 1 MINUTE: Performed by: STUDENT IN AN ORGANIZED HEALTH CARE EDUCATION/TRAINING PROGRAM

## 2025-07-08 PROCEDURE — 2720000007 HC OR 272 NO HCPCS: Performed by: STUDENT IN AN ORGANIZED HEALTH CARE EDUCATION/TRAINING PROGRAM

## 2025-07-08 PROCEDURE — A55700 PR BIOPSY OF PROSTATE,NEEDLE/PUNCH

## 2025-07-08 PROCEDURE — 7100000009 HC PHASE TWO TIME - INITIAL BASE CHARGE: Performed by: STUDENT IN AN ORGANIZED HEALTH CARE EDUCATION/TRAINING PROGRAM

## 2025-07-08 PROCEDURE — 77021 MRI GUIDANCE NDL PLMT RS&I: CPT | Performed by: STUDENT IN AN ORGANIZED HEALTH CARE EDUCATION/TRAINING PROGRAM

## 2025-07-08 PROCEDURE — 55700 PR PROSTATE NEEDLE BIOPSY ANY APPROACH: CPT | Performed by: STUDENT IN AN ORGANIZED HEALTH CARE EDUCATION/TRAINING PROGRAM

## 2025-07-08 PROCEDURE — 76872 US TRANSRECTAL: CPT | Performed by: STUDENT IN AN ORGANIZED HEALTH CARE EDUCATION/TRAINING PROGRAM

## 2025-07-08 PROCEDURE — 3700000001 HC GENERAL ANESTHESIA TIME - INITIAL BASE CHARGE: Performed by: STUDENT IN AN ORGANIZED HEALTH CARE EDUCATION/TRAINING PROGRAM

## 2025-07-08 RX ORDER — SODIUM CHLORIDE, SODIUM LACTATE, POTASSIUM CHLORIDE, CALCIUM CHLORIDE 600; 310; 30; 20 MG/100ML; MG/100ML; MG/100ML; MG/100ML
75 INJECTION, SOLUTION INTRAVENOUS CONTINUOUS
Status: DISCONTINUED | OUTPATIENT
Start: 2025-07-08 | End: 2025-07-08 | Stop reason: HOSPADM

## 2025-07-08 RX ORDER — BUPIVACAINE HYDROCHLORIDE 7.5 MG/ML
INJECTION, SOLUTION EPIDURAL; RETROBULBAR AS NEEDED
Status: DISCONTINUED | OUTPATIENT
Start: 2025-07-08 | End: 2025-07-08 | Stop reason: HOSPADM

## 2025-07-08 RX ORDER — POVIDONE-IODINE 10 %
SOLUTION, NON-ORAL TOPICAL AS NEEDED
Status: DISCONTINUED | OUTPATIENT
Start: 2025-07-08 | End: 2025-07-08 | Stop reason: HOSPADM

## 2025-07-08 RX ORDER — LIDOCAINE HYDROCHLORIDE 10 MG/ML
0.1 INJECTION, SOLUTION EPIDURAL; INFILTRATION; INTRACAUDAL; PERINEURAL ONCE
Status: DISCONTINUED | OUTPATIENT
Start: 2025-07-08 | End: 2025-07-08 | Stop reason: HOSPADM

## 2025-07-08 RX ORDER — PROPOFOL 10 MG/ML
INJECTION, EMULSION INTRAVENOUS AS NEEDED
Status: DISCONTINUED | OUTPATIENT
Start: 2025-07-08 | End: 2025-07-08

## 2025-07-08 RX ORDER — ONDANSETRON HYDROCHLORIDE 2 MG/ML
4 INJECTION, SOLUTION INTRAVENOUS ONCE AS NEEDED
Status: DISCONTINUED | OUTPATIENT
Start: 2025-07-08 | End: 2025-07-08 | Stop reason: HOSPADM

## 2025-07-08 RX ORDER — LIDOCAINE HCL/PF 100 MG/5ML
SYRINGE (ML) INTRAVENOUS AS NEEDED
Status: DISCONTINUED | OUTPATIENT
Start: 2025-07-08 | End: 2025-07-08

## 2025-07-08 RX ORDER — MIDAZOLAM HYDROCHLORIDE 1 MG/ML
INJECTION, SOLUTION INTRAMUSCULAR; INTRAVENOUS AS NEEDED
Status: DISCONTINUED | OUTPATIENT
Start: 2025-07-08 | End: 2025-07-08

## 2025-07-08 RX ORDER — ACETAMINOPHEN 10 MG/ML
INJECTION, SOLUTION INTRAVENOUS AS NEEDED
Status: DISCONTINUED | OUTPATIENT
Start: 2025-07-08 | End: 2025-07-08

## 2025-07-08 RX ORDER — CEFTRIAXONE 2 G/50ML
INJECTION, SOLUTION INTRAVENOUS AS NEEDED
Status: DISCONTINUED | OUTPATIENT
Start: 2025-07-08 | End: 2025-07-08

## 2025-07-08 RX ORDER — SODIUM CHLORIDE, SODIUM LACTATE, POTASSIUM CHLORIDE, CALCIUM CHLORIDE 600; 310; 30; 20 MG/100ML; MG/100ML; MG/100ML; MG/100ML
INJECTION, SOLUTION INTRAVENOUS CONTINUOUS PRN
Status: DISCONTINUED | OUTPATIENT
Start: 2025-07-08 | End: 2025-07-08

## 2025-07-08 RX ADMIN — PROPOFOL 150 MCG/KG/MIN: 10 INJECTION, EMULSION INTRAVENOUS at 12:53

## 2025-07-08 RX ADMIN — MIDAZOLAM 2 MG: 1 INJECTION INTRAMUSCULAR; INTRAVENOUS at 12:50

## 2025-07-08 RX ADMIN — CEFTRIAXONE 1 G: 2 INJECTION, SOLUTION INTRAVENOUS at 12:55

## 2025-07-08 RX ADMIN — ACETAMINOPHEN 1000 MG: 10 INJECTION INTRAVENOUS at 12:50

## 2025-07-08 RX ADMIN — PROPOFOL 40 MG: 10 INJECTION, EMULSION INTRAVENOUS at 12:55

## 2025-07-08 RX ADMIN — LIDOCAINE HYDROCHLORIDE 80 MG: 20 INJECTION INTRAVENOUS at 12:51

## 2025-07-08 RX ADMIN — SODIUM CHLORIDE, POTASSIUM CHLORIDE, SODIUM LACTATE AND CALCIUM CHLORIDE: 600; 310; 30; 20 INJECTION, SOLUTION INTRAVENOUS at 12:50

## 2025-07-08 SDOH — HEALTH STABILITY: MENTAL HEALTH: CURRENT SMOKER: 0

## 2025-07-08 ASSESSMENT — PAIN SCALES - GENERAL
PAINLEVEL_OUTOF10: 0 - NO PAIN

## 2025-07-08 ASSESSMENT — COLUMBIA-SUICIDE SEVERITY RATING SCALE - C-SSRS
1. IN THE PAST MONTH, HAVE YOU WISHED YOU WERE DEAD OR WISHED YOU COULD GO TO SLEEP AND NOT WAKE UP?: NO
2. HAVE YOU ACTUALLY HAD ANY THOUGHTS OF KILLING YOURSELF?: NO
6. HAVE YOU EVER DONE ANYTHING, STARTED TO DO ANYTHING, OR PREPARED TO DO ANYTHING TO END YOUR LIFE?: NO

## 2025-07-08 ASSESSMENT — PAIN - FUNCTIONAL ASSESSMENT
PAIN_FUNCTIONAL_ASSESSMENT: 0-10

## 2025-07-08 NOTE — ANESTHESIA POSTPROCEDURE EVALUATION
Patient: Anton Whitt    Procedure Summary       Date: 07/08/25 [place] [facility] [place] [facility] [place] [facility]    Anesthesia Start: 1250 Anesthesia Stop: 1320    Procedure: Biopsy Prostate with Navigation Diagnosis:       Elevated PSA      (Elevated PSA [R97.20])    Surgeons: Jose Garces MD Responsible Provider: Jerrod Villanueva MD    Anesthesia Type: MAC ASA Status: 3            Anesthesia Type: MAC    Vitals Value Taken Time   /98 07/08/25 13:30   Temp 36 °C (96.8 °F) 07/08/25 13:20   Pulse 54 07/08/25 13:30   Resp 16 07/08/25 13:30   SpO2 96 % 07/08/25 13:30       Anesthesia Post Evaluation    Patient location during evaluation: PACU  Patient participation: complete - patient participated  Level of consciousness: awake  Pain management: satisfactory to patient  Multimodal analgesia pain management approach  Airway patency: patent  Cardiovascular status: acceptable  Respiratory status: acceptable  Hydration status: stable  Postoperative Nausea and Vomiting: none  Comments: Did well        No notable events documented.

## 2025-07-08 NOTE — OP NOTE
Biopsy Prostate with Navigation Operative Note     Date: 2025  [facility]    Name: Anton Whitt, : 1960, Age: 65 y.o., MRN: [id number], Sex: male    Diagnosis  Pre-op Diagnosis      * Elevated PSA [R97.20] Post-op Diagnosis     * Elevated PSA [R97.20]     Procedures  Biopsy Prostate with Navigation  30711 - ME PROSTATE NEEDLE BIOPSY ANY APPROACH    Biopsy Prostate with Navigation  82146 - CHG US TRANSRECTAL    Biopsy Prostate with Navigation  70200 - CHG MRI GUIDANCE NEEDLE PLACEMENT RS&I      Surgeons      * Jose Garces - Primary    Resident/Fellow/Other Assistant:  Surgeons and Role:  * No surgeons found with a matching role *    Staff:   Lauraulator: Alana Tenorio Person: Jennie    Anesthesia Staff: Anesthesiologist: Jerrod Villanueva MD  C-AA: MARY Shin    Procedure Summary  Anesthesia: Monitor Anesthesia Care  ASA: III  Estimated Blood Loss: 10mL  Intra-op Medications: Administrations occurring from 1158 to 1238 on 25:  * No intraprocedure medications in log *           Anesthesia Record               Intraprocedure I/O Totals          Intake    LR infusion 400.00 mL    acetaminophen 1,000 mg/100 mL (10 mg/mL) 100.00 mL    cefTRIAXone 2 gram/50 mL 25.00 mL    Total Intake 525 mL          Specimen:   ID Type Source Tests Collected by Time   1 : RIGHT BASE Tissue PROSTATE NEEDLE BIOPSY RIGHT SURGICAL PATHOLOGY EXAM Jose Garces MD 2025 110   2 : LEFT PERIPHERAL Tissue PROSTATE BIOPSY TARGETED CARLOTTA SURGICAL PATHOLOGY EXAM Jose Garces MD 2025 1106   3 : LEFT APEX Tissue PROSTATE NEEDLE BIOPSY LEFT SURGICAL PATHOLOGY EXAM Jose Garces MD 2025 1106   4 : LEFT MID Tissue PROSTATE NEEDLE BIOPSY LEFT SURGICAL PATHOLOGY EXAM Jose Garces MD 2025 1106   5 : LEFT BASE Tissue PROSTATE NEEDLE BIOPSY LEFT SURGICAL PATHOLOGY EXAM Jose Garces MD 2025 110   6 : RIGHT APEX Tissue PROSTATE NEEDLE BIOPSY RIGHT  SURGICAL PATHOLOGY EXAM Jose Garces MD 7/8/2025 1106   7 : RIGHT MID Tissue PROSTATE NEEDLE BIOPSY RIGHT SURGICAL PATHOLOGY EXAM Jose Garces MD 7/8/2025 1106   8 : LEFT TRANSTION Tissue PROSTATE BIOPSY TARGETED CARLOTTA SURGICAL PATHOLOGY EXAM Jose Garces MD 7/8/2025 1301                 Drains and/or Catheters: none    Findings:   T1c exam  3 cores from CARLOTTA 1 at L mid peripheral  3 cores from CARLOTTA 2 at L anterior TZ/stroma  6 core template biopsy  Nasal packing to PACU x15min    Indications: Anton Whitt is an 65 y.o. male who is having surgery for Elevated PSA [R97.20].     Cipro  CTX 1g IV  Xarelto (PE) held 2d    The patient was seen in the preoperative area. The risks, benefits, complications, treatment options, non-operative alternatives, expected recovery and outcomes were discussed with the patient. The possibilities of reaction to medication, pulmonary aspiration, injury to surrounding structures, bleeding, recurrent infection, the need for additional procedures, failure to diagnose a condition, and creating a complication requiring transfusion or operation were discussed with the patient. The patient concurred with the proposed plan, giving informed consent.  The site of surgery was properly noted/marked if necessary per policy. The patient has been actively warmed in preoperative area. Preoperative antibiotics have been ordered and given within 1 hours of incision. Venous thrombosis prophylaxis are not indicated.    Procedure Details:   Patient transferred to the operative suite.  General anesthesia was induced.  Positioned in left lateral decubitus and prepped and draped in the usual sterile fashion.  Operative pause performed.    Digital rectal exam performed.  No nodules, T1c if positive.    Transrectal ultrasound inserted noting no overt hypoechoic lesions.  MRI fusion accomplished.    CARLOTTA x2 at left peripheral mid gland and left anterior transition zone/fibromuscular stroma  junction.  3 samples taken from each.    Then performed 6 core template biopsy.    Probe removed, pressure held and ensured hemostasis.  Some persistent oozing, placed nasal packing to be removed in PACU.    This concluded the procedure which the patient tolerated well.  Patient was cleaned, dried and awakened from anesthesia and transferred to PACU in excellent condition.    Plan:  Discharge home when meeting criteria  Follow up 3 weeks for path review      Evidence of Infection: No   Complications:  None; patient tolerated the procedure well.    Disposition: PACU - hemodynamically stable.  Condition: stable                 Additional Details: none    Attending Attestation: I was present and scrubbed for the entire procedure.    Jose Garces  Phone Number: [phone]

## 2025-07-08 NOTE — ANESTHESIA PREPROCEDURE EVALUATION
Patient: Anton Whitt    Procedure Information       Date/Time: 07/08/25 1158    Procedure: Biopsy Prostate with Navigation    [place] [facility] [place] [facility] [place] [facility] [facility]    Surgeons: Jose Garces MD            Relevant Problems   Cardiac   (+) Hyperlipidemia   (+) Hypertension   (+) Incomplete right bundle branch block (RBBB)      Pulmonary   (+) DONAVAN (obstructive sleep apnea)   (+) Recurrent pulmonary embolism (Multi)      Neuro   (+) Lumbar radiculopathy      /Renal   (+) Benign prostatic hyperplasia with incomplete bladder emptying      Endocrine   (+) Obesity      Musculoskeletal   (+) Lumbar stenosis with neurogenic claudication   (+) Osteoarthritis   (+) Primary osteoarthritis of both knees   (+) Spinal stenosis      Skin   (+) Eczema       Clinical information reviewed:   Tobacco  Allergies  Meds  Problems  Med Hx  Surg Hx   Fam Hx  Soc   Hx        NPO Detail:  NPO/Void Status  Date of Last Liquid: 07/08/25  Time of Last Liquid: 0800  Date of Last Solid: 07/07/25  Time of Last Solid: 1600         Physical Exam    Airway  Mallampati: III  TM distance: >3 FB  Neck ROM: full  Mouth opening: 3 or more finger widths     Cardiovascular - normal exam  Rhythm: regular  Rate: normal     Dental - normal exam     Pulmonary - normal examBreath sounds clear to auscultation     Abdominal            Anesthesia Plan    History of general anesthesia?: yes  History of complications of general anesthesia?: no    ASA 3     MAC     The patient is not a current smoker.    intravenous induction   Anesthetic plan and risks discussed with patient and spouse.    Plan discussed with CAA.

## 2025-07-08 NOTE — DISCHARGE INSTRUCTIONS
[facility] [place]    DISCHARGE INSTRUCTIONS     Anton Garces's contact information:  - Nurse line (clinical questions):  [phone]  - Admin line (scheduling questions):  [phone]  - After hours (Resident doctor, emergency questions only): [phone]    If you experience a life-threatening situation, proceed to the nearest emergency department or call 911.        Follow-up appointment:  as scheduled, 3 weeks pathology review - 7/31    Anticoagulation: continue holding Xarelto, restart on Saturday, 7/12  Do not travel until at least 24 hours after restarting Xarelto    Physicians:   Jose Garces MD    Procedure performed: prostate biopsy  Pending results:   prostate pathology results    What to Expect During your Recovery and Home Care  Anesthesia Side Effects   You received anesthesia today.  You may feel sleepy, tired, or have a sore throat.   You may also feel drowsiness, dizziness, or inability to think clearly.  For your safety, do not drive, drink alcoholic beverages, take any unprescribed medication or make any important decisions for 24 hours.  A responsible adult should be with you for 24 hours.        Activity and Recovery    No heavy lifting or strenuous activity for several days. Avoid activities that put pressure on your rectal area. Do not have sex for a few days.    Pain Control  You may experience pain after your procedure.  Adequate management can include alternative measures to help ease your pain and can include ice packs, and over the counter Tylenol can be taken as prescribed as needed for breakthrough pain. Do not take more then 4,000mg of Tylenol in a 24-hour period.      Nausea/Vomiting   Clear liquids are best tolerated at first. Start slow, advance your diet as tolerated to normal foods. Avoid spicy, greasy, heavy foods at first. Also, you may feel nauseous or like you need to vomit if you take any type of medication on an empty stomach.  Call your  physician if you are unable to eat or drink and have persistent vomiting.    Signs of Bleeding   Minor bleeding or drainage may occur from your rectum however, excessive or consistent bleeding should be reported to your surgeon. Excessive bleeding is defined as blood that is dripping from rectum, soaking through your pants, and is ketchup colored, thick with possible blood clots.  Consistent is defined as bleeding that does not stop. You may have blood in your poop and urine for several days and semen for several weeks.     Treatment/wound care:   It is okay to shower at any time after time of surgery.      Signs of Infection  Signs of infection can include fever, burning sensation with urination, frequency, urgency or severe pain.  If you see any of these occur, please contact your doctor's office.  If any fever higher than 100.4, especially if associated with an ill feeling, abdominal pain, chills, or nausea - go to the nearest emergency room for IV antibiotics.      Assist in bowel movements/urination  Increase fiber in diet  Urination should occur within 6 hours of anesthesia.   Increase water (6 to 8 glasses)  Increase walking   If you have tried these methods and your bladder still feels full and you cannot use the bathroom for 4 hours, please go to your nearest Emergency room.    Additional Instructions:   Try not to strain when going to the bathroom. Do not hold your urine. We will go over your biopsy results at your follow up appointment. It takes about 3 weeks for the results to come back.     Jose Garces MD    May have Tylenol after: 6:50 pm

## 2025-07-09 ASSESSMENT — PAIN SCALES - GENERAL: PAINLEVEL_OUTOF10: 0 - NO PAIN

## 2025-07-17 LAB
LAB AP ASR DISCLAIMER: NORMAL
LAB AP BLOCK FOR ADDITIONAL STUDIES: NORMAL
LABORATORY COMMENT REPORT: NORMAL
PATH REPORT.FINAL DX SPEC: NORMAL
PATH REPORT.GROSS SPEC: NORMAL
PATH REPORT.RELEVANT HX SPEC: NORMAL
PATH REPORT.TOTAL CANCER: NORMAL

## 2025-07-23 DIAGNOSIS — R00.0 TACHYCARDIA: ICD-10-CM

## 2025-07-23 DIAGNOSIS — F51.01 PRIMARY INSOMNIA: ICD-10-CM

## 2025-07-23 RX ORDER — METOPROLOL SUCCINATE 25 MG/1
25 TABLET, EXTENDED RELEASE ORAL DAILY
Qty: 90 TABLET | Refills: 3 | Status: SHIPPED | OUTPATIENT
Start: 2025-07-23 | End: 2026-07-18

## 2025-07-23 RX ORDER — ZOLPIDEM TARTRATE 5 MG/1
5 TABLET ORAL NIGHTLY PRN
Qty: 90 TABLET | Refills: 3 | Status: SHIPPED | OUTPATIENT
Start: 2025-07-23

## 2025-07-30 ENCOUNTER — APPOINTMENT (OUTPATIENT)
Dept: PRIMARY CARE | Facility: CLINIC | Age: 65
End: 2025-07-30
Payer: COMMERCIAL

## 2025-07-30 VITALS
BODY MASS INDEX: 33.1 KG/M2 | SYSTOLIC BLOOD PRESSURE: 120 MMHG | OXYGEN SATURATION: 96 % | HEART RATE: 78 BPM | HEIGHT: 70 IN | WEIGHT: 231.2 LBS | TEMPERATURE: 97.9 F | DIASTOLIC BLOOD PRESSURE: 81 MMHG

## 2025-07-30 DIAGNOSIS — E78.5 HYPERLIPIDEMIA, UNSPECIFIED HYPERLIPIDEMIA TYPE: ICD-10-CM

## 2025-07-30 DIAGNOSIS — C61 ADENOCARCINOMA OF PROSTATE (MULTI): ICD-10-CM

## 2025-07-30 DIAGNOSIS — I26.99 RECURRENT PULMONARY EMBOLISM (MULTI): ICD-10-CM

## 2025-07-30 DIAGNOSIS — I10 PRIMARY HYPERTENSION: ICD-10-CM

## 2025-07-30 DIAGNOSIS — Z00.00 ROUTINE MEDICAL EXAM: Primary | ICD-10-CM

## 2025-07-30 PROCEDURE — 1036F TOBACCO NON-USER: CPT | Performed by: INTERNAL MEDICINE

## 2025-07-30 PROCEDURE — 93000 ELECTROCARDIOGRAM COMPLETE: CPT | Performed by: INTERNAL MEDICINE

## 2025-07-30 PROCEDURE — 3074F SYST BP LT 130 MM HG: CPT | Performed by: INTERNAL MEDICINE

## 2025-07-30 PROCEDURE — 3079F DIAST BP 80-89 MM HG: CPT | Performed by: INTERNAL MEDICINE

## 2025-07-30 PROCEDURE — 99396 PREV VISIT EST AGE 40-64: CPT | Performed by: INTERNAL MEDICINE

## 2025-07-30 PROCEDURE — 3008F BODY MASS INDEX DOCD: CPT | Performed by: INTERNAL MEDICINE

## 2025-07-30 ASSESSMENT — ENCOUNTER SYMPTOMS
FEVER: 0
PHOTOPHOBIA: 0
COUGH: 0
PALPITATIONS: 0
DIZZINESS: 0
CHILLS: 0
ABDOMINAL PAIN: 0
CHEST TIGHTNESS: 0
FREQUENCY: 0
NAUSEA: 0
FATIGUE: 0
CONSTIPATION: 0
DYSURIA: 0
TROUBLE SWALLOWING: 0
ACTIVITY CHANGE: 0
TREMORS: 0
DIARRHEA: 0
BACK PAIN: 0
ARTHRALGIAS: 0
DIFFICULTY URINATING: 0
APPETITE CHANGE: 0
SHORTNESS OF BREATH: 0
BLOOD IN STOOL: 0
SORE THROAT: 0
NECK PAIN: 0
SLEEP DISTURBANCE: 0

## 2025-07-30 ASSESSMENT — PATIENT HEALTH QUESTIONNAIRE - PHQ9
1. LITTLE INTEREST OR PLEASURE IN DOING THINGS: NOT AT ALL
2. FEELING DOWN, DEPRESSED OR HOPELESS: NOT AT ALL
SUM OF ALL RESPONSES TO PHQ9 QUESTIONS 1 AND 2: 0

## 2025-07-30 NOTE — PROGRESS NOTES
Subjective   Patient ID: Anton Whitt is a 65 y.o. male who presents for Annual Exam.    65 y.o. here for a AMWV/physical    History of Present Illness  The patient is a 65-year-old male who comes in today for a full physical.    Allergies  - He reports overall good health, with the exception of his allergies.    Vision  - He has recently acquired new glasses due to a decline in his vision.    Prostate Trouble  - He mentions having prostate trouble and is scheduled to see Dr. Pedroza tomorrow to discuss treatment options for his Juan score 6 prostate cancer.  - The cancer was found in one of the three core biopsies.    Medications  - He is currently on Xarelto, which was temporarily stopped for his procedure.  - He took Cipro the day before and after his procedure.  - He is also taking trazodone and zolpidem for sleep, which he reports as being effective.  - He mentions that if he forgets to take his medication, he realizes it the next day due to poor sleep.  - He is on prednisone 1 mg twice daily for polymyalgia rheumatica (PMR) and plans to reduce the dose to 2 mg daily once his prostate issue is resolved.  - He is on Xarelto for a previous pulmonary embolus (PE).    Dermatology Consultation  - He has never consulted a dermatologist before but has attempted to make an appointment, which is scheduled for February.    Sleep  - He reports so-so sleep and relies on trazodone and zolpidem for effective sleep.    He does not experience any difficulty with hearing, eating, swallowing, or chest pain. He also does not report any shortness of breath or issues with bowel movements or urination.           Review of Systems   Constitutional:  Negative for activity change, appetite change, chills, fatigue and fever.   HENT:  Negative for congestion, ear pain, sore throat, tinnitus and trouble swallowing.    Eyes:  Negative for photophobia and visual disturbance.   Respiratory:  Negative for cough, chest tightness and  "shortness of breath.    Cardiovascular:  Negative for chest pain, palpitations and leg swelling.   Gastrointestinal:  Negative for abdominal pain, blood in stool, constipation, diarrhea and nausea.   Genitourinary:  Negative for difficulty urinating, dysuria, frequency, genital sores, testicular pain and urgency.   Musculoskeletal:  Negative for arthralgias, back pain, gait problem and neck pain.   Skin:  Negative for rash.   Neurological:  Negative for dizziness and tremors.   Psychiatric/Behavioral:  Negative for sleep disturbance.    All other systems reviewed and are negative.      Objective   Visit Vitals  /81   Pulse 78   Temp 36.6 °C (97.9 °F)   Ht 1.765 m (5' 9.5\")   Wt 105 kg (231 lb 3.2 oz)   SpO2 96%   BMI 33.65 kg/m²   Smoking Status Never   BSA 2.27 m²      Patient Health Questionnaire-2 Score: 0 (7/30/2025 11:37 AM)  Results  - Diagnostic Testing:    - Prostate biopsy: Juan score 6 prostate cancer found in one of the three core biopsies.     Physical Exam  Physical Exam  Physical Exam  Constitutional: General: Pt is not in acute distress.  Appearance: Pt is well-developed. She is not diaphoretic.  HENT: Mouth/Throat: Oral exam performed.  Head: Normocephalic.  Right Ear: Tympanic membrane normal. There is no impacted cerumen.  Left Ear: Tympanic membrane normal. There is no impacted cerumen.  Nose: Nose normal.  Mouth: Mucous membranes are moist.  Pharynx: Oropharynx is clear. No oropharyngeal exudate or posterior oropharyngeal erythema.  Eyes: General: No scleral icterus.  Extraocular Movements: Extraocular movements intact.  Conjunctiva/sclera: Conjunctivae normal.  Pupils: Pupils are equal, round, and reactive to light.  Neck: Thyroid: No thyromegaly.  Vascular: No JVD.  Cardiovascular: Rate and Rhythm: Normal rate and regular rhythm.  Pulses: Normal pulses.  Heart sounds: Normal heart sounds. No murmur heard.  No friction rub. No gallop.  Pulmonary: Clear to auscultation, no wheezing, " rales or rhonchi  Chest: Chest wall: No tenderness.  Abdominal: General: Bowel sounds are normal. There is no distension.  Palpations: Abdomen is soft. There is no mass.  Tenderness: There is no abdominal tenderness. There is no rebound.  Musculoskeletal: General: Normal range of motion.  Cervical back: Normal range of motion and neck supple.  Lymphadenopathy: Cervical: No cervical adenopathy.  Skin: General: Skin is warm and dry.  Neurological: General: No focal deficit present.  Mental Status: Pt is alert and oriented to person, place, and time.  Deep Tendon Reflexes: Reflexes normal.  Psychiatric: Mood and Affect: Mood normal.  Thought Content: Thought content normal.     Assessment & Plan  1. Prostate cancer  - Chamisal score of 6 prostate cancer identified in one of the three core biopsies  - Follow up with Dr. Garces tomorrow to discuss results and potential treatment options  - Monitoring PSA levels  - Evaluating treatment options based on biopsy results    2. H/O PE -  - Currently on Xarelto for previous pulmonary embolus; held Xarelto for recent procedure    3. Insomnia -   - Taking trazodone and zolpidem for sleep; reports inconsistent sleep if medications are missed    4. Polymyalgia rheumatica (PMR)  - Currently taking prednisone 4 mg daily  - Plans to reduce dosage to 2 mg daily once prostate issues are resolved  - Monitoring for any episodes of PMR symptoms when adjusting the dosage    4. Dermatology consultation  - Attempted to make an appointment with a dermatologist; earliest available appointment is in February  - On the waitlist for an earlier slot  - Monitoring skin healing and related concerns  - Evaluating options for dermatology consultation        1. Routine medical exam  ECG 12 lead (Clinic Performed)      2. Primary hypertension        3. Hyperlipidemia, unspecified hyperlipidemia type        4. Recurrent pulmonary embolism (Multi)        5. Adenocarcinoma of prostate (Multi)                 Follow up with me in 4 months    This medical note was created with the assistance of artificial intelligence (AI) for documentation purposes. The content has been reviewed and confirmed by the healthcare provider for accuracy and completeness. Patient consented to the use of audio recording and use of AI during their visit.

## 2025-07-31 ENCOUNTER — APPOINTMENT (OUTPATIENT)
Dept: UROLOGY | Facility: CLINIC | Age: 65
End: 2025-07-31
Payer: COMMERCIAL

## 2025-07-31 VITALS
SYSTOLIC BLOOD PRESSURE: 131 MMHG | HEART RATE: 77 BPM | WEIGHT: 233.2 LBS | DIASTOLIC BLOOD PRESSURE: 83 MMHG | BODY MASS INDEX: 33.39 KG/M2 | HEIGHT: 70 IN | TEMPERATURE: 97.8 F

## 2025-07-31 DIAGNOSIS — N52.8 OTHER MALE ERECTILE DYSFUNCTION: ICD-10-CM

## 2025-07-31 DIAGNOSIS — R97.20 ELEVATED PSA: ICD-10-CM

## 2025-07-31 PROCEDURE — 3079F DIAST BP 80-89 MM HG: CPT | Performed by: STUDENT IN AN ORGANIZED HEALTH CARE EDUCATION/TRAINING PROGRAM

## 2025-07-31 PROCEDURE — 3075F SYST BP GE 130 - 139MM HG: CPT | Performed by: STUDENT IN AN ORGANIZED HEALTH CARE EDUCATION/TRAINING PROGRAM

## 2025-07-31 PROCEDURE — 99024 POSTOP FOLLOW-UP VISIT: CPT | Performed by: STUDENT IN AN ORGANIZED HEALTH CARE EDUCATION/TRAINING PROGRAM

## 2025-07-31 PROCEDURE — 3008F BODY MASS INDEX DOCD: CPT | Performed by: STUDENT IN AN ORGANIZED HEALTH CARE EDUCATION/TRAINING PROGRAM

## 2025-07-31 PROCEDURE — 1036F TOBACCO NON-USER: CPT | Performed by: STUDENT IN AN ORGANIZED HEALTH CARE EDUCATION/TRAINING PROGRAM

## 2025-07-31 RX ORDER — TADALAFIL 5 MG/1
5 TABLET ORAL DAILY
Qty: 90 TABLET | Refills: 11 | Status: SHIPPED | OUTPATIENT
Start: 2025-07-31

## 2025-07-31 ASSESSMENT — ENCOUNTER SYMPTOMS
DEPRESSION: 0
LOSS OF SENSATION IN FEET: 0
OCCASIONAL FEELINGS OF UNSTEADINESS: 0

## 2025-07-31 NOTE — PROGRESS NOTES
Chief complaint:  Follow-up (Path Review)  Referring physician:  No ref. provider found     SUBJECTIVE:  HPI:  Anton Whitt is a 65 y.o. male with a history of HTN, HLD, PE on Xarelto, on ppx ASA 81mg, OA with spinal stenosis, ODNAVAN, BPH (46g), ED, elevated PSA who presents for initial evaluation of elevated PSA, abnormal prostate MRI, discussion of prostate biopsy.    7/31/25 - Reviewed path with patient and wife.  Single core at L base PR4 CARLOTTA GG1 adenocarcinoma <5% of specimen so 1/12 cores positive.  7/8/25 - MRI fusion PNB.  T1c exam.  ROIs x2.  6/11/25 - Referred by Dr. Morales.  Doing well.  Curious about prostate biopsy process.  6/4/25 - Started tadalafil 5mg daily for ED and BPH  5/12/25 - C/o some urinary frequency, urgency, and nocturia. Not bothersome.   Denies prior hx of gross hematuria or issues with UTIs.   H/o mild ED  PVR: 148 mL  IPSS: 12 (not bothersome)    PSA hx:  6.09 ~ April 2025  5.7 ~ (lab saba)     Prostate MRI 06/02/25  PI-RADS 4 and PI-RADS 3 lesions.   No definite signs of extracapsular extension.   No pelvic lymphadenopathy.   Prostate weight: 46g  PSA density: 0.13 ng/mL/g.     Medical history:   has a past medical history of Acute embolism and thrombosis of unspecified deep veins of unspecified lower extremity (11/11/2013), Acute upper respiratory infection, unspecified (04/25/2018), Arthritis, Body mass index (BMI) 35.0-35.9, adult (09/17/2018), BPH (benign prostatic hyperplasia), Encounter for other preprocedural examination (06/25/2015), Encounter for other preprocedural examination (06/25/2015), Encounter for screening for cardiovascular disorders, History of DVT (deep vein thrombosis), History of pulmonary embolus (PE), Hypertension, Immunocompromised, Insect bite (nonvenomous) of lower back and pelvis, initial encounter (08/05/2014), Lumbar disc disease, Ocular pain, left eye (09/07/2018), Other conditions influencing health status (03/21/2017), Other polyuria (05/29/2013),  Other shoulder lesions, left shoulder (04/11/2016), Other shoulder lesions, unspecified shoulder (03/13/2013), Other specified soft tissue disorders (03/21/2017), Pain in left knee (08/23/2019), Pain in unspecified knee (12/09/2014), Personal history of other diseases of the circulatory system (01/20/2020), Personal history of other diseases of the circulatory system, Personal history of other diseases of the nervous system and sense organs (05/06/2015), Personal history of other diseases of the nervous system and sense organs, Personal history of other endocrine, nutritional and metabolic disease (04/14/2015), Personal history of other specified conditions (02/19/2020), Personal history of other specified conditions (06/26/2013), Personal history of other specified conditions, Phlebitis and thrombophlebitis of superficial vessels of left lower extremity (03/21/2017), Phlebitis and thrombophlebitis of superficial vessels of right lower extremity (03/21/2017), Phlebitis and thrombophlebitis of unspecified site, Sigmoid diverticulitis (05/16/2023), Sleep apnea, and Spinal stenosis.   Surgical history:   has a past surgical history that includes Other surgical history (04/29/2013); Knee arthroscopy w/ debridement (04/29/2013); Back surgery (10/26/2015); Other surgical history (10/26/2015); and Other surgical history (03/09/2022).  Family history:  family history includes Cancer in his father; Heart attack in his mother.  Social history:   reports that he has never smoked. He has never used smokeless tobacco. He reports current alcohol use of about 3.0 standard drinks of alcohol per week. He reports that he does not use drugs.    Medications:    Current Outpatient Medications   Medication Instructions    aspirin 81 mg EC tablet 1 tablet, Daily    calcium carbonate-vitamin D3 600 mg-5 mcg (200 unit) tablet 1 tablet, Daily    cyclobenzaprine (FLEXERIL) 5 mg, oral, Nightly PRN    metoprolol succinate XL (TOPROL-XL) 25 mg,  "oral, Daily, Do not crush or chew.    predniSONE (DELTASONE) 2 mg, oral, 2 times daily    rivaroxaban (XARELTO) 10 mg, oral, Daily    tadalafil (CIALIS) 5 mg, oral, Daily    traZODone (Desyrel) 50 mg tablet TAKE TWO TABLETS BY MOUTH DAILY AT BEDTIME AS NEEDED FOR SLEEPLESSNESS    zolpidem (AMBIEN) 5 mg, oral, Nightly PRN      Allergies:    RX Allergies[1]     ROS:  14-point review of systems negative except as noted above.    OBJECTIVE:  Visit Vitals  /83   Pulse 77   Temp 36.6 °C (97.8 °F)   Body mass index is 33.94 kg/m².    Physical exam  General:  No acute distress  HEENT:  EOMI  CV:  Regular rate  Pulm:  Nonlabored respirations  Abd:  Soft, non-distended  :  No suprapubic or CVA tenderness  MSK:  No contractures  Neuro:  Motor intact  Psych:  Appropriate affect    Labs:    Lab Results   Component Value Date    WBC 9.4 11/20/2024    HGB 15.4 11/20/2024    HCT 46.7 11/20/2024     11/20/2024    ALT 13 08/12/2024    AST 19 08/12/2024     08/12/2024    K 4.2 08/12/2024     08/12/2024    CREATININE 1.04 08/12/2024    BUN 18 08/12/2024    CO2 26 08/12/2024    INR 1.1 05/18/2023    HGBA1C 5.8 (H) 04/17/2025   No results found for: \"URINECULTURE\"   Lab Results   Component Value Date    PSA 6.09 (H) 04/17/2025     Imaging:  All imaging discussed in HPI was independently reviewed.    ASSESSMENT:  Very low risk localized prostate adenocarcinoma  BPH with LUTS  Erectile dysfunction due to arterial insufficiency    Discussed diagnosis of prostate cancer at length including risk stratification, recommendation of active surveillance for very low risk prostate cancer.    PLAN:  PSA in 3, 6 months  Consider MRI if rising or at 1y  Will do repeat biopsy if indicated  Refill of tadalafil 5mg    Follow-up 6 months    Jose Garces MD    Problem List Items Addressed This Visit    None            [1]   Allergies  Allergen Reactions    Shellfish Containing Products Unknown     All seafood    " Sulfamethoxazole-Trimethoprim Hives and Rash     Bactrim

## 2025-08-01 DIAGNOSIS — R39.14 BENIGN PROSTATIC HYPERPLASIA WITH INCOMPLETE BLADDER EMPTYING: ICD-10-CM

## 2025-08-01 DIAGNOSIS — N40.1 BENIGN PROSTATIC HYPERPLASIA WITH INCOMPLETE BLADDER EMPTYING: ICD-10-CM

## 2025-08-01 RX ORDER — TADALAFIL 5 MG/1
5 TABLET ORAL DAILY
Qty: 30 TABLET | Refills: 0 | Status: SHIPPED | OUTPATIENT
Start: 2025-08-01 | End: 2025-08-31

## 2025-08-12 DIAGNOSIS — G47.00 INSOMNIA, UNSPECIFIED TYPE: ICD-10-CM

## 2025-08-12 RX ORDER — TRAZODONE HYDROCHLORIDE 50 MG/1
TABLET ORAL
Qty: 180 TABLET | Refills: 1 | Status: SHIPPED | OUTPATIENT
Start: 2025-08-12

## 2025-09-03 ENCOUNTER — APPOINTMENT (OUTPATIENT)
Dept: PRIMARY CARE | Facility: CLINIC | Age: 65
End: 2025-09-03
Payer: COMMERCIAL

## 2025-09-11 ENCOUNTER — APPOINTMENT (OUTPATIENT)
Dept: PRIMARY CARE | Facility: CLINIC | Age: 65
End: 2025-09-11
Payer: COMMERCIAL

## 2025-10-08 ENCOUNTER — APPOINTMENT (OUTPATIENT)
Dept: UROLOGY | Facility: CLINIC | Age: 65
End: 2025-10-08
Payer: COMMERCIAL

## 2025-11-26 ENCOUNTER — APPOINTMENT (OUTPATIENT)
Dept: PRIMARY CARE | Facility: CLINIC | Age: 65
End: 2025-11-26
Payer: COMMERCIAL

## 2026-01-20 ENCOUNTER — APPOINTMENT (OUTPATIENT)
Dept: UROLOGY | Facility: CLINIC | Age: 66
End: 2026-01-20
Payer: COMMERCIAL

## 2026-02-17 ENCOUNTER — APPOINTMENT (OUTPATIENT)
Dept: DERMATOLOGY | Facility: CLINIC | Age: 66
End: 2026-02-17
Payer: COMMERCIAL

## 2026-03-05 ENCOUNTER — APPOINTMENT (OUTPATIENT)
Dept: PRIMARY CARE | Facility: CLINIC | Age: 66
End: 2026-03-05
Payer: COMMERCIAL

## 2026-08-05 ENCOUNTER — APPOINTMENT (OUTPATIENT)
Dept: PRIMARY CARE | Facility: CLINIC | Age: 66
End: 2026-08-05
Payer: COMMERCIAL

## (undated) DEVICE — Device

## (undated) DEVICE — ULTRASOUND URONAV

## (undated) DEVICE — URONAV HITACHI PROBE HOLDER

## (undated) DEVICE — SYRINGE, 10 CC, LUER LOCK

## (undated) DEVICE — DRESSING, NON-ADHERENT, TELFA, OUCHLESS, 3 X 8 IN, STERILE

## (undated) DEVICE — COVER, TABLE, 44X90

## (undated) DEVICE — PROBE COVER, ULTRASOUND 8818

## (undated) DEVICE — DRESSING, GAUZE, SPONGE, 12 PLY, 4 X 4 IN, PLASTIC POUCH, STRL 10PK

## (undated) DEVICE — URONAV MRI FUSION SYSTEM

## (undated) DEVICE — LABELS, OR GENERAL, W/MARKER